# Patient Record
Sex: FEMALE | Race: WHITE | NOT HISPANIC OR LATINO | Employment: OTHER | URBAN - METROPOLITAN AREA
[De-identification: names, ages, dates, MRNs, and addresses within clinical notes are randomized per-mention and may not be internally consistent; named-entity substitution may affect disease eponyms.]

---

## 2023-04-28 PROBLEM — D64.9 ANEMIA: Status: ACTIVE | Noted: 2023-04-28

## 2023-04-28 PROBLEM — T78.3XXA ACE INHIBITOR-AGGRAVATED ANGIOEDEMA, INITIAL ENCOUNTER: Status: ACTIVE | Noted: 2023-04-28

## 2023-04-28 PROBLEM — D50.9 IRON DEFICIENCY ANEMIA: Status: ACTIVE | Noted: 2023-04-28

## 2023-04-28 PROBLEM — J45.901 EXACERBATION OF ASTHMA (HHS-HCC): Status: ACTIVE | Noted: 2023-04-28

## 2023-04-28 PROBLEM — E78.5 HYPERLIPIDEMIA: Status: ACTIVE | Noted: 2023-04-28

## 2023-04-28 PROBLEM — S43.016A CLOSED ANTERIOR DISLOCATION OF HUMERUS: Status: ACTIVE | Noted: 2023-04-28

## 2023-04-28 PROBLEM — R50.9 ELEVATED TEMPERATURE: Status: ACTIVE | Noted: 2023-04-28

## 2023-04-28 PROBLEM — F32.A DEPRESSION: Status: ACTIVE | Noted: 2023-04-28

## 2023-04-28 PROBLEM — C18.9 COLON CANCER (MULTI): Status: ACTIVE | Noted: 2023-04-28

## 2023-04-28 PROBLEM — K44.9 DIAPHRAGMATIC HERNIA: Status: ACTIVE | Noted: 2023-04-28

## 2023-04-28 PROBLEM — J45.41: Status: ACTIVE | Noted: 2023-04-28

## 2023-04-28 PROBLEM — J32.9 CHRONIC SINUSITIS: Status: ACTIVE | Noted: 2023-04-28

## 2023-04-28 PROBLEM — E83.42 HYPOMAGNESEMIA: Status: ACTIVE | Noted: 2023-04-28

## 2023-04-28 PROBLEM — M25.551 HIP PAIN, RIGHT: Status: ACTIVE | Noted: 2023-04-28

## 2023-04-28 PROBLEM — G47.00 INSOMNIA: Status: ACTIVE | Noted: 2023-04-28

## 2023-04-28 PROBLEM — D17.1 LIPOMA OF BACK: Status: ACTIVE | Noted: 2023-04-28

## 2023-04-28 PROBLEM — T46.4X5A ACE INHIBITOR-AGGRAVATED ANGIOEDEMA, INITIAL ENCOUNTER: Status: ACTIVE | Noted: 2023-04-28

## 2023-04-28 PROBLEM — R07.89 ATYPICAL CHEST PAIN: Status: ACTIVE | Noted: 2023-04-28

## 2023-04-28 PROBLEM — K21.9 GERD (GASTROESOPHAGEAL REFLUX DISEASE): Status: ACTIVE | Noted: 2023-04-28

## 2023-04-28 PROBLEM — E61.1 IRON DEFICIENCY: Status: ACTIVE | Noted: 2023-04-28

## 2023-04-28 PROBLEM — M85.69: Status: ACTIVE | Noted: 2023-04-28

## 2023-04-28 PROBLEM — G56.01 CARPAL TUNNEL SYNDROME, RIGHT: Status: ACTIVE | Noted: 2023-04-28

## 2023-04-28 PROBLEM — R51.9 LEFT TEMPORAL HEADACHE: Status: ACTIVE | Noted: 2023-04-28

## 2023-04-28 PROBLEM — R94.39 ABNORMAL STRESS TEST: Status: ACTIVE | Noted: 2023-04-28

## 2023-04-28 PROBLEM — J01.00 ACUTE MAXILLARY SINUSITIS: Status: ACTIVE | Noted: 2023-04-28

## 2023-04-28 PROBLEM — I10 BENIGN ESSENTIAL HYPERTENSION: Status: ACTIVE | Noted: 2023-04-28

## 2023-04-28 PROBLEM — E03.9 HYPOTHYROIDISM: Status: ACTIVE | Noted: 2023-04-28

## 2023-04-28 PROBLEM — E87.6 HYPOKALEMIA: Status: ACTIVE | Noted: 2023-04-28

## 2023-04-28 RX ORDER — ACETAMINOPHEN 500 MG
TABLET ORAL EVERY 6 HOURS PRN
COMMUNITY

## 2023-04-28 RX ORDER — PHENYLEPHRINE HCL 10 MG
TABLET ORAL
COMMUNITY
End: 2023-05-11 | Stop reason: SDUPTHER

## 2023-04-28 RX ORDER — FLUTICASONE PROPIONATE AND SALMETEROL 250; 50 UG/1; UG/1
1 POWDER RESPIRATORY (INHALATION) DAILY
COMMUNITY
Start: 2023-01-24

## 2023-04-28 RX ORDER — POTASSIUM CHLORIDE 750 MG/1
1 TABLET, FILM COATED, EXTENDED RELEASE ORAL DAILY
COMMUNITY
Start: 2019-01-23 | End: 2023-05-11 | Stop reason: SDUPTHER

## 2023-04-28 RX ORDER — ATORVASTATIN CALCIUM 40 MG/1
1 TABLET, FILM COATED ORAL NIGHTLY
COMMUNITY
Start: 2019-04-16 | End: 2024-02-22 | Stop reason: SDUPTHER

## 2023-04-28 RX ORDER — BUPROPION HYDROCHLORIDE 150 MG/1
1 TABLET, EXTENDED RELEASE ORAL EVERY 12 HOURS
COMMUNITY
Start: 2019-04-16 | End: 2024-02-22 | Stop reason: SDUPTHER

## 2023-04-28 RX ORDER — LEVOTHYROXINE SODIUM 125 UG/1
1 TABLET ORAL DAILY
COMMUNITY
Start: 2018-06-28 | End: 2023-05-11 | Stop reason: SDUPTHER

## 2023-04-28 RX ORDER — OMEPRAZOLE 20 MG/1
CAPSULE, DELAYED RELEASE ORAL
COMMUNITY
Start: 2019-06-13

## 2023-04-28 RX ORDER — ASPIRIN 81 MG/1
1 TABLET ORAL DAILY
COMMUNITY
End: 2023-11-13 | Stop reason: ALTCHOICE

## 2023-04-28 RX ORDER — MONTELUKAST SODIUM 10 MG/1
1 TABLET ORAL DAILY
COMMUNITY
Start: 2019-11-04 | End: 2024-02-22 | Stop reason: SDUPTHER

## 2023-04-28 RX ORDER — ACETAMINOPHEN, DIPHENHYDRAMINE HCL, PHENYLEPHRINE HCL 325; 25; 5 MG/1; MG/1; MG/1
TABLET ORAL
COMMUNITY
End: 2023-11-13 | Stop reason: ALTCHOICE

## 2023-04-28 RX ORDER — FERROUS SULFATE 325(65) MG
1 TABLET ORAL DAILY
COMMUNITY
Start: 2020-04-22

## 2023-04-28 RX ORDER — LANOLIN ALCOHOL/MO/W.PET/CERES
CREAM (GRAM) TOPICAL
COMMUNITY
Start: 2019-01-15 | End: 2023-06-15

## 2023-04-28 RX ORDER — MINERAL OIL
1 ENEMA (ML) RECTAL DAILY
COMMUNITY
Start: 2019-06-13

## 2023-04-28 RX ORDER — ALBUTEROL SULFATE 90 UG/1
AEROSOL, METERED RESPIRATORY (INHALATION) EVERY 6 HOURS
COMMUNITY
Start: 2018-06-28

## 2023-04-28 RX ORDER — ACETAMINOPHEN 500 MG
TABLET ORAL
COMMUNITY

## 2023-04-28 RX ORDER — AMLODIPINE BESYLATE 10 MG/1
1 TABLET ORAL DAILY
COMMUNITY
Start: 2018-08-14

## 2023-04-28 RX ORDER — VITS A,C,E/LUTEIN/MINERALS 300MCG-200
1 TABLET ORAL DAILY
COMMUNITY

## 2023-04-28 RX ORDER — FLUTICASONE PROPIONATE 110 UG/1
AEROSOL, METERED RESPIRATORY (INHALATION) 2 TIMES DAILY
COMMUNITY
End: 2023-11-13 | Stop reason: ALTCHOICE

## 2023-04-28 RX ORDER — PARSLEY 450 MG
CAPSULE ORAL
COMMUNITY
Start: 2022-07-18 | End: 2023-11-13 | Stop reason: WASHOUT

## 2023-04-28 RX ORDER — METFORMIN HYDROCHLORIDE 850 MG/1
1 TABLET ORAL
COMMUNITY
Start: 2019-04-16 | End: 2024-02-22 | Stop reason: SDUPTHER

## 2023-04-28 RX ORDER — GABAPENTIN 300 MG/1
1 CAPSULE ORAL NIGHTLY
COMMUNITY
Start: 2018-06-28 | End: 2023-05-11 | Stop reason: SDUPTHER

## 2023-05-05 LAB
ALANINE AMINOTRANSFERASE (SGPT) (U/L) IN SER/PLAS: 15 U/L (ref 7–45)
ALBUMIN (G/DL) IN SER/PLAS: 3.9 G/DL (ref 3.4–5)
ALBUMIN (MG/L) IN URINE: <7 MG/L
ALBUMIN/CREATININE (UG/MG) IN URINE: NORMAL UG/MG CRT (ref 0–30)
ALKALINE PHOSPHATASE (U/L) IN SER/PLAS: 71 U/L (ref 33–136)
ANION GAP IN SER/PLAS: 13 MMOL/L (ref 10–20)
ASPARTATE AMINOTRANSFERASE (SGOT) (U/L) IN SER/PLAS: 17 U/L (ref 9–39)
BASOPHILS (10*3/UL) IN BLOOD BY AUTOMATED COUNT: 0.06 X10E9/L (ref 0–0.1)
BASOPHILS/100 LEUKOCYTES IN BLOOD BY AUTOMATED COUNT: 0.8 % (ref 0–2)
BILIRUBIN TOTAL (MG/DL) IN SER/PLAS: 0.4 MG/DL (ref 0–1.2)
CALCIDIOL (25 OH VITAMIN D3) (NG/ML) IN SER/PLAS: 55 NG/ML
CALCIUM (MG/DL) IN SER/PLAS: 9 MG/DL (ref 8.6–10.3)
CARBON DIOXIDE, TOTAL (MMOL/L) IN SER/PLAS: 28 MMOL/L (ref 21–32)
CHLORIDE (MMOL/L) IN SER/PLAS: 107 MMOL/L (ref 98–107)
CHOLESTEROL (MG/DL) IN SER/PLAS: 145 MG/DL (ref 0–199)
CHOLESTEROL IN HDL (MG/DL) IN SER/PLAS: 76.6 MG/DL
CHOLESTEROL/HDL RATIO: 1.9
COBALAMIN (VITAMIN B12) (PG/ML) IN SER/PLAS: 660 PG/ML (ref 211–911)
CREATININE (MG/DL) IN SER/PLAS: 0.8 MG/DL (ref 0.5–1.05)
CREATININE (MG/DL) IN URINE: 64.7 MG/DL (ref 20–320)
EOSINOPHILS (10*3/UL) IN BLOOD BY AUTOMATED COUNT: 0.33 X10E9/L (ref 0–0.4)
EOSINOPHILS/100 LEUKOCYTES IN BLOOD BY AUTOMATED COUNT: 4.2 % (ref 0–6)
ERYTHROCYTE DISTRIBUTION WIDTH (RATIO) BY AUTOMATED COUNT: 13.6 % (ref 11.5–14.5)
ERYTHROCYTE MEAN CORPUSCULAR HEMOGLOBIN CONCENTRATION (G/DL) BY AUTOMATED: 31.4 G/DL (ref 32–36)
ERYTHROCYTE MEAN CORPUSCULAR VOLUME (FL) BY AUTOMATED COUNT: 93 FL (ref 80–100)
ERYTHROCYTES (10*6/UL) IN BLOOD BY AUTOMATED COUNT: 4.2 X10E12/L (ref 4–5.2)
ESTIMATED AVERAGE GLUCOSE FOR HBA1C: 128 MG/DL
GFR FEMALE: 76 ML/MIN/1.73M2
GLUCOSE (MG/DL) IN SER/PLAS: 87 MG/DL (ref 74–99)
HEMATOCRIT (%) IN BLOOD BY AUTOMATED COUNT: 39.2 % (ref 36–46)
HEMOGLOBIN (G/DL) IN BLOOD: 12.3 G/DL (ref 12–16)
HEMOGLOBIN A1C/HEMOGLOBIN TOTAL IN BLOOD: 6.1 %
IMMATURE GRANULOCYTES/100 LEUKOCYTES IN BLOOD BY AUTOMATED COUNT: 0.1 % (ref 0–0.9)
LDL: 52 MG/DL (ref 0–99)
LEUKOCYTES (10*3/UL) IN BLOOD BY AUTOMATED COUNT: 7.9 X10E9/L (ref 4.4–11.3)
LYMPHOCYTES (10*3/UL) IN BLOOD BY AUTOMATED COUNT: 2.5 X10E9/L (ref 0.8–3)
LYMPHOCYTES/100 LEUKOCYTES IN BLOOD BY AUTOMATED COUNT: 31.5 % (ref 13–44)
MAGNESIUM (MG/DL) IN SER/PLAS: 1.26 MG/DL (ref 1.6–2.4)
MONOCYTES (10*3/UL) IN BLOOD BY AUTOMATED COUNT: 0.37 X10E9/L (ref 0.05–0.8)
MONOCYTES/100 LEUKOCYTES IN BLOOD BY AUTOMATED COUNT: 4.7 % (ref 2–10)
NEUTROPHILS (10*3/UL) IN BLOOD BY AUTOMATED COUNT: 4.66 X10E9/L (ref 1.6–5.5)
NEUTROPHILS/100 LEUKOCYTES IN BLOOD BY AUTOMATED COUNT: 58.7 % (ref 40–80)
PLATELETS (10*3/UL) IN BLOOD AUTOMATED COUNT: 337 X10E9/L (ref 150–450)
POTASSIUM (MMOL/L) IN SER/PLAS: 3.8 MMOL/L (ref 3.5–5.3)
PROTEIN TOTAL: 6.7 G/DL (ref 6.4–8.2)
SODIUM (MMOL/L) IN SER/PLAS: 144 MMOL/L (ref 136–145)
THYROTROPIN (MIU/L) IN SER/PLAS BY DETECTION LIMIT <= 0.05 MIU/L: 3.02 MIU/L (ref 0.44–3.98)
TRIGLYCERIDE (MG/DL) IN SER/PLAS: 80 MG/DL (ref 0–149)
UREA NITROGEN (MG/DL) IN SER/PLAS: 18 MG/DL (ref 6–23)
VLDL: 16 MG/DL (ref 0–40)

## 2023-05-06 LAB
ALLERGEN ANIMAL: CAT DANDER IGE (KU/L): 6.23 KU/L
ALLERGEN ANIMAL: DOG DANDER IGE (KU/L): 0.43 KU/L
ALLERGEN GRASS: BERMUDA GRASS (CYNODON DACTYLON) IGE (KU/L): <0.1 KU/L
ALLERGEN GRASS: JOHNSON GRASS (SORGHUM HALEPENSE) IGE (KU/L): <0.1 KU/L
ALLERGEN GRASS: MEADOW GRASS, KENTUCKY BLUE (POA PRATENSIS )IGE (KU/L): <0.1 KU/L
ALLERGEN GRASS: TIMOTHY GRASS (PHLEUM PRATENSE) IGE (KU/L): <0.1 KU/L
ALLERGEN INSECT: COCKROACH IGE: <0.1 KU/L
ALLERGEN MICROORGANISM: ALTERNARIA ALTERNATA IGE (KU/L): <0.1 KU/L
ALLERGEN MICROORGANISM: ASPERGILLUS FUMIGATUS IGE (KU/L): <0.1 KU/L
ALLERGEN MICROORGANISM: CLADOSPORIUM HERBARUM IGE (KU/L): <0.1 KU/L
ALLERGEN MICROORGANISM: PENICILLIUM CHRYSOGENUM (P. NOTATUM) IGE (KU/L): <0.1 KU/L
ALLERGEN MITE: DERMATOPHAGOIDES FARINAE (HOUSE DUST MITE) IGE (KU/L): <0.1 KU/L
ALLERGEN MITE: DERMATOPHAGOIDES PTERONYSSINUS (HOUSE DUST MITE) IGE (KU/L): <0.1 KU/L
ALLERGEN TREE: BOX-ELDER (ACER NEGUNDO) IGE (KU/L): <0.1 KU/L
ALLERGEN TREE: COMMON SILVER BIRCH (BETULA VERRUCOSA) IGE (KU/L): <0.1 KU/L
ALLERGEN TREE: COTTONWOOD (POPULUS DELTOIDES) IGE (KU/L): <0.1 KU/L
ALLERGEN TREE: ELM (ULMUS AMERICANA) IGE (KU/L): <0.1 KU/L
ALLERGEN TREE: MAPLE LEAF SYCAMORE, LONDON PLANE IGE (KU/L): <0.1 KU/L
ALLERGEN TREE: MOUNTAIN JUNIPER (JUNIPERUS SABINOIDES) IGE (KU/L): <0.1 KU/L
ALLERGEN TREE: MULBERRY (MORUS ALBA) IGE (KU/L): <0.1 KU/L
ALLERGEN TREE: OAK (QUERCUS ALBA) IGE (KU/L): <0.1 KU/L
ALLERGEN TREE: PECAN, HICKORY (CARYA PECAN) IGE (KU/L): <0.1 KU/L
ALLERGEN TREE: WALNUT IGE: <0.1 KU/L
ALLERGEN TREE: WHITE ASH (FRAXINUS AMERICANA) IGE (KU/L): <0.1 KU/L
ALLERGEN WEED: COMMON PIGWEED (AMARANTHUS RETROFLEXUS) IGE (KU/L): <0.1 KU/L
ALLERGEN WEED: COMMON RAGWEED (AMB. ARTEMISIIFOLIA/A. ELATIOR) IGE (KU/L): <0.1 KU/L
ALLERGEN WEED: GOOSEFOOT, LAMB'S QUARTERS (CHENOPODIUM ALBUM) IGE (KU/L): 0.12 KU/L
ALLERGEN WEED: PLANTAIN (ENGLISH), RIBWORT (PLANTAGO LANCEOLATA) IGE (KU/L): <0.1 KU/L
ALLERGEN WEED: PRICKLY SALTWORT/RUSSIAN THISTLE (SALSOLA KALI) IGE (KU/L): <0.1 KU/L
ALLERGEN WEED: SHEEP SORREL (RUMEX ACETOSELLA) IGE (KU/L): <0.1 KU/L
IMMUNOCAP IGE: 47.1 KU/L (ref 0–214)
IMMUNOCAP INTERPRETATION: ABNORMAL

## 2023-05-11 ENCOUNTER — OFFICE VISIT (OUTPATIENT)
Dept: PRIMARY CARE | Facility: CLINIC | Age: 78
End: 2023-05-11
Payer: MEDICARE

## 2023-05-11 VITALS
OXYGEN SATURATION: 100 % | RESPIRATION RATE: 16 BRPM | SYSTOLIC BLOOD PRESSURE: 132 MMHG | HEART RATE: 82 BPM | HEIGHT: 65 IN | DIASTOLIC BLOOD PRESSURE: 72 MMHG | WEIGHT: 167 LBS | BODY MASS INDEX: 27.82 KG/M2 | TEMPERATURE: 97.7 F

## 2023-05-11 DIAGNOSIS — M70.61 TROCHANTERIC BURSITIS OF RIGHT HIP: ICD-10-CM

## 2023-05-11 DIAGNOSIS — E66.9 DIABETES MELLITUS TYPE 2 IN OBESE: ICD-10-CM

## 2023-05-11 DIAGNOSIS — E78.5 HYPERLIPIDEMIA, UNSPECIFIED HYPERLIPIDEMIA TYPE: ICD-10-CM

## 2023-05-11 DIAGNOSIS — E87.6 HYPOKALEMIA: ICD-10-CM

## 2023-05-11 DIAGNOSIS — E03.8 OTHER SPECIFIED HYPOTHYROIDISM: Primary | ICD-10-CM

## 2023-05-11 DIAGNOSIS — O28.0 LOW MATERNAL SERUM VITAMIN B12: ICD-10-CM

## 2023-05-11 DIAGNOSIS — C18.9 MALIGNANT NEOPLASM OF COLON, UNSPECIFIED PART OF COLON (MULTI): ICD-10-CM

## 2023-05-11 DIAGNOSIS — J45.41 ACUTE EXACERBATION OF MODERATE PERSISTENT EXTRINSIC ASTHMA (HHS-HCC): ICD-10-CM

## 2023-05-11 DIAGNOSIS — E11.69 DIABETES MELLITUS TYPE 2 IN OBESE: ICD-10-CM

## 2023-05-11 DIAGNOSIS — Z12.31 VISIT FOR SCREENING MAMMOGRAM: ICD-10-CM

## 2023-05-11 PROCEDURE — 1159F MED LIST DOCD IN RCRD: CPT | Performed by: INTERNAL MEDICINE

## 2023-05-11 PROCEDURE — 1036F TOBACCO NON-USER: CPT | Performed by: INTERNAL MEDICINE

## 2023-05-11 PROCEDURE — 3075F SYST BP GE 130 - 139MM HG: CPT | Performed by: INTERNAL MEDICINE

## 2023-05-11 PROCEDURE — 3078F DIAST BP <80 MM HG: CPT | Performed by: INTERNAL MEDICINE

## 2023-05-11 PROCEDURE — 1157F ADVNC CARE PLAN IN RCRD: CPT | Performed by: INTERNAL MEDICINE

## 2023-05-11 PROCEDURE — 99214 OFFICE O/P EST MOD 30 MIN: CPT | Performed by: INTERNAL MEDICINE

## 2023-05-11 RX ORDER — PHENYLEPHRINE HCL 10 MG
1000 TABLET ORAL 2 TIMES DAILY
COMMUNITY

## 2023-05-11 RX ORDER — LEVOTHYROXINE SODIUM 125 UG/1
125 TABLET ORAL DAILY
Qty: 90 TABLET | Refills: 1 | Status: SHIPPED | OUTPATIENT
Start: 2023-05-11 | End: 2023-11-13 | Stop reason: SDUPTHER

## 2023-05-11 RX ORDER — GABAPENTIN 300 MG/1
300 CAPSULE ORAL NIGHTLY
Qty: 90 CAPSULE | Refills: 1 | Status: SHIPPED | OUTPATIENT
Start: 2023-05-11 | End: 2024-02-22 | Stop reason: SDUPTHER

## 2023-05-11 RX ORDER — POTASSIUM CHLORIDE 750 MG/1
10 TABLET, FILM COATED, EXTENDED RELEASE ORAL DAILY
Qty: 90 TABLET | Refills: 1 | Status: SHIPPED | OUTPATIENT
Start: 2023-05-11 | End: 2023-11-13

## 2023-05-11 SDOH — ECONOMIC STABILITY: FOOD INSECURITY: WITHIN THE PAST 12 MONTHS, YOU WORRIED THAT YOUR FOOD WOULD RUN OUT BEFORE YOU GOT MONEY TO BUY MORE.: NEVER TRUE

## 2023-05-11 SDOH — ECONOMIC STABILITY: FOOD INSECURITY: WITHIN THE PAST 12 MONTHS, THE FOOD YOU BOUGHT JUST DIDN'T LAST AND YOU DIDN'T HAVE MONEY TO GET MORE.: NEVER TRUE

## 2023-05-11 ASSESSMENT — LIFESTYLE VARIABLES
AUDIT-C TOTAL SCORE: 2
SKIP TO QUESTIONS 9-10: 1
HOW OFTEN DO YOU HAVE A DRINK CONTAINING ALCOHOL: 2-4 TIMES A MONTH
HOW OFTEN DO YOU HAVE SIX OR MORE DRINKS ON ONE OCCASION: NEVER
HOW MANY STANDARD DRINKS CONTAINING ALCOHOL DO YOU HAVE ON A TYPICAL DAY: PATIENT DOES NOT DRINK

## 2023-05-11 ASSESSMENT — PATIENT HEALTH QUESTIONNAIRE - PHQ9
SUM OF ALL RESPONSES TO PHQ9 QUESTIONS 1 & 2: 0
1. LITTLE INTEREST OR PLEASURE IN DOING THINGS: NOT AT ALL
2. FEELING DOWN, DEPRESSED OR HOPELESS: NOT AT ALL

## 2023-05-11 ASSESSMENT — ENCOUNTER SYMPTOMS
LOSS OF SENSATION IN FEET: 0
OCCASIONAL FEELINGS OF UNSTEADINESS: 1
DEPRESSION: 0

## 2023-05-11 NOTE — PROGRESS NOTES
Chief Complaint/HPI:    History of Present Illness  asthma, patient has seen pulmonary medicine, she states that treatment with Wixela has been very helpful. Allergy testing was completed, she is allergic to cats, patient does have 2 cats she states      foot ulcer: patient had a large callus that was trimmed by Dr Patel, patient is monitored by podiatry every 10 weeks     hypomagnesemia: takes magnesium once daily     iron deficiency/ colon cancer: patient had right hemicolectomy in 8/2020 after colonoscopy noted colon cancer. Patient has not had any additional issues, Dr Pereira has retired. Patient is doing well now. Had colonoscopy after start of pandemic, per Dr Ramirez, it was normal. No further colonoscopies are required for 5 years from most recent colonoscopy.      mammogram screening: patient was advised to get a follow up mammogram after 6/29/2023, most recent mammogram was done at [facility] otherwise negative aside from what was mentioned above in HPI.      Patient Active Problem List   Diagnosis    Abnormal stress test    ACE inhibitor-aggravated angioedema, initial encounter    Acute exacerbation of moderate persistent extrinsic asthma    Acute maxillary sinusitis    Anemia    Exacerbation of asthma    Atypical chest pain    Benign essential hypertension    Carpal tunnel syndrome, right    Chronic sinusitis    Closed anterior dislocation of humerus    Colon cancer (CMS/HCC)    Depression    Diaphragmatic hernia    Elevated temperature    GERD (gastroesophageal reflux disease)    Hip pain, right    Hyperlipidemia    Hypokalemia    Hypomagnesemia    Hypothyroidism    Insomnia    Iron deficiency    Iron deficiency anemia    Left temporal headache    Lipoma of back    Localized cyst of bone    Chronic pain    Displacement of lumbar intervertebral disc without myelopathy    Inflammation of right sacroiliac joint (CMS/HCC)    Myalgia    Trochanteric bursitis of right hip    Pain in right hip     Diabetes mellitus type 2 in obese (CMS/[facility])         Past Medical History:   Diagnosis Date    Personal history of other benign neoplasm     History of benign neoplasm of colon    Personal history of other endocrine, nutritional and metabolic disease     History of hyperlipidemia    Retinal detachment with single break, unspecified eye     Partial recent retinal detachment with single defect    Vitreous degeneration, unspecified eye     Vitreous degeneration     Past Surgical History:   Procedure Laterality Date    OTHER SURGICAL HISTORY  2020    Hemicolectomy    OTHER SURGICAL HISTORY  2020    Colon surgery    OTHER SURGICAL HISTORY  2020    Hip replacement    OTHER SURGICAL HISTORY  2020     section    OTHER SURGICAL HISTORY  2020    Cholecystectomy    OTHER SURGICAL HISTORY  2020    Amputation    OTHER SURGICAL HISTORY  2020    Colonoscopy    OTHER SURGICAL HISTORY  2020    Toe tenotomy     Social History     Social History Narrative    Not on file         ALLERGIES  Bacitracin, Codeine, and Lisinopril      MEDICATIONS  Current Outpatient Medications on File Prior to Visit   Medication Sig Dispense Refill    acetaminophen (Tylenol) 500 mg tablet Take by mouth every 6 hours if needed.      albuterol 90 mcg/actuation inhaler Inhale every 6 hours.      amLODIPine (Norvasc) 5 mg tablet Take 1 tablet (5 mg) by mouth once daily.      aspirin 81 mg EC tablet Take 1 tablet (81 mg) by mouth once daily.      atorvastatin (Lipitor) 40 mg tablet Take 1 tablet (40 mg) by mouth once daily at bedtime.      buPROPion SR (Wellbutrin SR) 150 mg 12 hr tablet Take 1 tablet (150 mg) by mouth every 12 hours.      cholecalciferol (Vitamin D-3) 50 mcg (2,000 unit) capsule Take by mouth.      cinnamon 500 mg capsule Take 2 capsules (1,000 mg) by mouth twice a day.      ferrous sulfate 325 (65 Fe) MG tablet Take 1 tablet (325 mg) by mouth once daily.      fexofenadine (Allegra) 180 mg  "tablet Take 1 tablet (180 mg) by mouth once daily.      magnesium oxide (Mag-Ox) 400 mg (241.3 mg magnesium) tablet Take by mouth.      metFORMIN (Glucophage) 850 mg tablet Take 1 tablet (850 mg) by mouth 2 times a day with meals.      montelukast (Singulair) 10 mg tablet Take 1 tablet (10 mg) by mouth once daily.      omeprazole (PriLOSEC) 20 mg DR capsule Take by mouth once daily.      vit A,C and E-lutein-minerals (Ocuvite with Lutein) 300 mcg-200 mg-27 mg-2 mg tablet Take 1 tablet by mouth once daily.      Wixela Inhub 250-50 mcg/dose diskus inhaler Inhale 1 puff once daily.      [DISCONTINUED] cinnamon 500 mg capsule Take by mouth.      [DISCONTINUED] gabapentin (Neurontin) 300 mg capsule Take 1 capsule (300 mg) by mouth once daily at bedtime.      [DISCONTINUED] levothyroxine (Synthroid, Levoxyl) 125 mcg tablet Take 1 tablet (125 mcg) by mouth once daily.      [DISCONTINUED] potassium chloride CR (Klor-Con) 10 mEq ER tablet Take 1 tablet (10 mEq) by mouth once daily.      ashwagandha root extract 500 mg capsule Take by mouth.      fluticasone (Flovent) 110 mcg/actuation inhaler Inhale twice a day.      melatonin 10 mg tablet Take by mouth.       No current facility-administered medications on file prior to visit.         PHYSICAL EXAM  /72 (BP Location: Left arm, Patient Position: Sitting, BP Cuff Size: Adult)   Pulse 82   Temp 36.5 °C (97.7 °F)   Resp 16   Ht 1.638 m (5' 4.5\")   Wt 75.8 kg (167 lb)   SpO2 100%   BMI 28.22 kg/m²   Body mass index is 28.22 kg/m².  Constitutional   General appearance: Alert and in no acute distress. well developed, well nourished, overweight and pleasant , wearing a mask   Eyes   Inspection of eyes: Sclera and conjunctiva were normal.   Ears, Nose, Mouth, and Throat   Ears: Auricles: Normal. No thyromegaly or neck masses are noted   Pulmonary   Respiratory assessment: No respiratory distress, normal respiratory rhythm and effort.    Auscultation of lungs: Abnormal.  " Auscultation of the lungs revealed no wheezing, no rales or crackles were heard bilaterally. no rhonchi. no friction rub. No diminished breath sounds. no bronchial breath sounds.   Cardiovascular   Auscultation of heart: Abnormal.  The heart rate was normal. The rhythm was regular. Heart sounds: normal S1 and normal S2. A grade 2 systolic murmur was heard at the LUSB. A grade 1 systolic murmur was heard at the RLSB. Unchanged. No carotid bruits are noted  Lymphatic   Palpation of lymph nodes in neck: No cervical lymphadenopathy.   Neurologic   Cranial nerves: Nerves 2-12 were intact, no focal neuro defects. wearing a mask.   Psychiatric   Orientation: Oriented to person, place, and time.    Mood and affect: Normal.     ASSESSMENT/PLAN  Problem List Items Addressed This Visit          Respiratory    Acute exacerbation of moderate persistent extrinsic asthma    Current Assessment & Plan     Asthma is well controlled now, no changes in treatment, follow up with pulmonary medicine as scheduled         Relevant Orders    Vitamin D 1,25 Dihydroxy    Comprehensive Metabolic Panel    CBC and Auto Differential       Digestive    Colon cancer (CMS/HCC)    Current Assessment & Plan     To follow up for colonoscopy with Dr Ramirez every 5 years         Relevant Orders    Vitamin D 1,25 Dihydroxy    Comprehensive Metabolic Panel    CBC and Auto Differential       Musculoskeletal    Trochanteric bursitis of right hip    Relevant Medications    gabapentin (Neurontin) 300 mg capsule    Other Relevant Orders    Vitamin D 1,25 Dihydroxy    Comprehensive Metabolic Panel    CBC and Auto Differential       Endocrine/Metabolic    Hypothyroidism - Primary    Current Assessment & Plan     check thyroid studies prior to next appointment         Relevant Medications    levothyroxine (Synthroid, Levoxyl) 125 mcg tablet    Other Relevant Orders    Vitamin D 1,25 Dihydroxy    Comprehensive Metabolic Panel    CBC and Auto Differential     Diabetes mellitus type 2 in obese (CMS/HCC)    Current Assessment & Plan     Stable on metformin therapy, recheck labs in 6 months, prior to next visit         Relevant Orders    Hemoglobin A1C    Vitamin D 1,25 Dihydroxy    Albumin , Urine Random    Comprehensive Metabolic Panel    CBC and Auto Differential       Other    Hyperlipidemia    Current Assessment & Plan     No med changes, recheck labs in 6 months         Relevant Orders    TSH with reflex to Free T4 if abnormal    Vitamin D 1,25 Dihydroxy    Comprehensive Metabolic Panel    CBC and Auto Differential    Lipid panel    Hypokalemia    Relevant Medications    potassium chloride CR (Klor-Con) 10 mEq ER tablet    Other Relevant Orders    Vitamin D 1,25 Dihydroxy    Comprehensive Metabolic Panel    CBC and Auto Differential     Other Visit Diagnoses       Visit for screening mammogram        Relevant Orders    BI mammo bilateral screening tomosynthesis    Vitamin D 1,25 Dihydroxy    Comprehensive Metabolic Panel    CBC and Auto Differential    Low maternal serum vitamin B12        Relevant Orders    Vitamin B12          Check labs in 6 months, follow up in 6 months  Check mammograms as requested  Melo Benjamin MD

## 2023-05-11 NOTE — ASSESSMENT & PLAN NOTE
Asthma is well controlled now, no changes in treatment, follow up with pulmonary medicine as scheduled

## 2023-06-14 DIAGNOSIS — E83.42 HYPOMAGNESEMIA: Primary | ICD-10-CM

## 2023-06-15 RX ORDER — LANOLIN ALCOHOL/MO/W.PET/CERES
CREAM (GRAM) TOPICAL
Qty: 90 TABLET | Refills: 0 | Status: SHIPPED | OUTPATIENT
Start: 2023-06-15 | End: 2023-09-22

## 2023-09-21 DIAGNOSIS — E83.42 HYPOMAGNESEMIA: ICD-10-CM

## 2023-09-22 PROBLEM — M54.30 SCIATICA: Status: ACTIVE | Noted: 2023-09-22

## 2023-09-22 PROBLEM — M79.89 SWELLING OF CALF: Status: ACTIVE | Noted: 2023-09-22

## 2023-09-22 PROBLEM — K63.5 POLYP OF COLON: Status: ACTIVE | Noted: 2023-09-22

## 2023-09-22 PROBLEM — J30.9 ALLERGIC RHINITIS: Status: ACTIVE | Noted: 2023-09-22

## 2023-09-22 PROBLEM — M85.89 OSTEOPENIA OF MULTIPLE SITES: Status: ACTIVE | Noted: 2023-09-22

## 2023-09-22 PROBLEM — G56.00: Status: ACTIVE | Noted: 2023-09-22

## 2023-09-22 PROBLEM — M79.609 PAIN IN SOFT TISSUES OF LIMB: Status: ACTIVE | Noted: 2023-09-22

## 2023-09-22 PROBLEM — M25.519 PAIN IN JOINT, SHOULDER REGION: Status: ACTIVE | Noted: 2023-09-22

## 2023-09-22 PROBLEM — M20.40 HAMMER TOE: Status: ACTIVE | Noted: 2023-09-22

## 2023-09-22 PROBLEM — N28.1 RENAL CYSTS, ACQUIRED, BILATERAL: Status: ACTIVE | Noted: 2023-09-22

## 2023-09-22 PROBLEM — M25.561 POSTERIOR RIGHT KNEE PAIN: Status: ACTIVE | Noted: 2023-09-22

## 2023-09-22 PROBLEM — M79.661 RIGHT CALF PAIN: Status: ACTIVE | Noted: 2023-09-22

## 2023-09-22 PROBLEM — G47.33 OBSTRUCTIVE SLEEP APNEA SYNDROME IN ADULT: Status: ACTIVE | Noted: 2023-09-22

## 2023-09-22 PROBLEM — M54.50 LOW BACK PAIN: Status: ACTIVE | Noted: 2023-09-22

## 2023-09-22 PROBLEM — J45.40 MODERATE PERSISTENT ASTHMA (HHS-HCC): Status: ACTIVE | Noted: 2023-09-22

## 2023-09-22 PROBLEM — E53.8 VITAMIN B12 DEFICIENCY: Status: ACTIVE | Noted: 2023-09-22

## 2023-09-22 PROBLEM — C18.2 MALIGNANT NEOPLASM OF ASCENDING COLON (MULTI): Status: ACTIVE | Noted: 2023-09-22

## 2023-09-22 PROBLEM — J06.9 URI, ACUTE: Status: ACTIVE | Noted: 2023-09-22

## 2023-09-22 PROBLEM — E11.41: Status: ACTIVE | Noted: 2023-09-22

## 2023-09-22 PROBLEM — M17.12 PRIMARY OSTEOARTHRITIS OF LEFT KNEE: Status: ACTIVE | Noted: 2023-09-22

## 2023-09-22 PROBLEM — R07.9 CHEST PAIN: Status: ACTIVE | Noted: 2023-09-22

## 2023-09-22 PROBLEM — E11.40 NEUROPATHY IN DIABETES (MULTI): Status: ACTIVE | Noted: 2023-09-22

## 2023-09-22 PROBLEM — G44.209 MUSCLE TENSION HEADACHE: Status: ACTIVE | Noted: 2023-09-22

## 2023-09-22 PROBLEM — M85.80 OSTEOPENIA: Status: ACTIVE | Noted: 2023-09-22

## 2023-09-22 PROBLEM — E11.9 DIABETES MELLITUS (MULTI): Status: ACTIVE | Noted: 2023-09-22

## 2023-09-22 PROBLEM — R01.1 SYSTOLIC EJECTION MURMUR: Status: ACTIVE | Noted: 2023-09-22

## 2023-09-22 PROBLEM — Z87.39 H/O DEGENERATIVE DISC DISEASE: Status: ACTIVE | Noted: 2023-09-22

## 2023-09-22 PROBLEM — E55.9 VITAMIN D DEFICIENCY: Status: ACTIVE | Noted: 2023-09-22

## 2023-09-22 PROBLEM — M71.22 SYNOVIAL CYST OF LEFT POPLITEAL SPACE: Status: ACTIVE | Noted: 2023-09-22

## 2023-09-22 PROBLEM — F32.9 MAJOR DEPRESSIVE DISORDER, SINGLE EPISODE: Status: ACTIVE | Noted: 2023-09-22

## 2023-09-22 PROBLEM — J45.909 ASTHMA (HHS-HCC): Status: ACTIVE | Noted: 2023-09-22

## 2023-09-22 RX ORDER — LANOLIN ALCOHOL/MO/W.PET/CERES
CREAM (GRAM) TOPICAL
Qty: 90 TABLET | Refills: 1 | Status: SHIPPED | OUTPATIENT
Start: 2023-09-22 | End: 2024-03-28

## 2023-10-04 ENCOUNTER — ANCILLARY PROCEDURE (OUTPATIENT)
Dept: PREADMISSION TESTING | Facility: HOSPITAL | Age: 78
End: 2023-10-04
Payer: MEDICARE

## 2023-10-04 VITALS
BODY MASS INDEX: 26.91 KG/M2 | HEART RATE: 81 BPM | HEIGHT: 65 IN | OXYGEN SATURATION: 99 % | TEMPERATURE: 98.7 F | WEIGHT: 161.5 LBS | RESPIRATION RATE: 16 BRPM

## 2023-10-04 DIAGNOSIS — K43.9 HERNIA OF ABDOMINAL WALL: Primary | ICD-10-CM

## 2023-10-04 LAB
ANION GAP SERPL CALC-SCNC: 15 MMOL/L (ref 10–20)
BUN SERPL-MCNC: 15 MG/DL (ref 6–23)
CALCIUM SERPL-MCNC: 9.2 MG/DL (ref 8.6–10.3)
CHLORIDE SERPL-SCNC: 106 MMOL/L (ref 98–107)
CO2 SERPL-SCNC: 23 MMOL/L (ref 21–32)
CREAT SERPL-MCNC: 0.84 MG/DL (ref 0.5–1.05)
ERYTHROCYTE [DISTWIDTH] IN BLOOD BY AUTOMATED COUNT: 13.9 % (ref 11.5–14.5)
GFR SERPL CREATININE-BSD FRML MDRD: 72 ML/MIN/1.73M*2
GLUCOSE SERPL-MCNC: 79 MG/DL (ref 74–99)
HCT VFR BLD AUTO: 39.8 % (ref 36–46)
HGB BLD-MCNC: 12.8 G/DL (ref 12–16)
MCH RBC QN AUTO: 29.2 PG (ref 26–34)
MCHC RBC AUTO-ENTMCNC: 32.2 G/DL (ref 32–36)
MCV RBC AUTO: 91 FL (ref 80–100)
NRBC BLD-RTO: 0 /100 WBCS (ref 0–0)
PLATELET # BLD AUTO: 343 X10*3/UL (ref 150–450)
PMV BLD AUTO: 9.3 FL (ref 7.5–11.5)
POTASSIUM SERPL-SCNC: 4.1 MMOL/L (ref 3.5–5.3)
RBC # BLD AUTO: 4.39 X10*6/UL (ref 4–5.2)
SODIUM SERPL-SCNC: 140 MMOL/L (ref 136–145)
WBC # BLD AUTO: 8.3 X10*3/UL (ref 4.4–11.3)

## 2023-10-04 PROCEDURE — 93005 ELECTROCARDIOGRAM TRACING: CPT

## 2023-10-04 PROCEDURE — 36415 COLL VENOUS BLD VENIPUNCTURE: CPT

## 2023-10-04 PROCEDURE — 85027 COMPLETE CBC AUTOMATED: CPT

## 2023-10-04 PROCEDURE — 80048 BASIC METABOLIC PNL TOTAL CA: CPT

## 2023-10-04 ASSESSMENT — DUKE ACTIVITY SCORE INDEX (DASI)
CAN YOU TAKE CARE OF YOURSELF (EAT, DRESS, BATHE, OR USE TOILET): YES
CAN YOU DO HEAVY WORK AROUND THE HOUSE LIKE SCRUBBING FLOORS OR LIFTING AND MOVING HEAVY FURNITURE: YES
TOTAL_SCORE: 36.7
CAN YOU PARTICIPATE IN STRENOUS SPORTS LIKE SWIMMING, SINGLES TENNIS, FOOTBALL, BASKETBALL, OR SKIING: NO
DASI METS SCORE: 7.3
CAN YOU DO YARD WORK LIKE RAKING LEAVES, WEEDING OR PUSHING A MOWER: YES
CAN YOU CLIMB A FLIGHT OF STAIRS OR WALK UP A HILL: YES
CAN YOU WALK A BLOCK OR TWO ON LEVEL GROUND: YES
CAN YOU DO LIGHT WORK AROUND THE HOUSE LIKE DUSTING OR WASHING DISHES: YES
CAN YOU PARTICIPATE IN MODERATE RECREATIONAL ACTIVITIES LIKE GOLF, BOWLING, DANCING, DOUBLES TENNIS OR THROWING A BASEBALL OR FOOTBALL: NO
CAN YOU DO MODERATE WORK AROUND THE HOUSE LIKE VACUUMING, SWEEPING FLOORS OR CARRYING GROCERIES: YES
CAN YOU HAVE SEXUAL RELATIONS: YES
CAN YOU WALK INDOORS, SUCH AS AROUND YOUR HOUSE: YES
CAN YOU RUN A SHORT DISTANCE: NO

## 2023-10-04 NOTE — PREPROCEDURE INSTRUCTIONS
Medication List            Accurate as of October 4, 2023 12:56 PM. Always use your most recent med list.                acetaminophen 500 mg tablet  Commonly known as: Tylenol     albuterol 90 mcg/actuation inhaler     amLODIPine 5 mg tablet  Commonly known as: Norvasc     ashwagandha root extract 500 mg capsule     aspirin 81 mg EC tablet     atorvastatin 40 mg tablet  Commonly known as: Lipitor     buPROPion  mg 12 hr tablet  Commonly known as: Wellbutrin SR     cholecalciferol 50 mcg (2,000 unit) capsule  Commonly known as: Vitamin D-3     cinnamon 500 mg capsule     ferrous sulfate 325 (65 Fe) MG tablet     fexofenadine 180 mg tablet  Commonly known as: Allegra     fluticasone 110 mcg/actuation inhaler  Commonly known as: Flovent     gabapentin 300 mg capsule  Commonly known as: Neurontin  Take 1 capsule (300 mg) by mouth once daily at bedtime.     levothyroxine 125 mcg tablet  Commonly known as: Synthroid, Levoxyl  Take 1 tablet (125 mcg) by mouth once daily.     magnesium oxide 400 mg (241.3 mg magnesium) tablet  Commonly known as: Mag-Ox  TAKE 1 TABLET BY MOUTH DAILY     melatonin 10 mg tablet     metFORMIN 850 mg tablet  Commonly known as: Glucophage     montelukast 10 mg tablet  Commonly known as: Singulair     Ocuvite with Lutein 300 mcg-200 mg-27 mg-2 mg tablet  Generic drug: vit A,C and E-lutein-minerals     omeprazole 20 mg DR capsule  Commonly known as: PriLOSEC     potassium chloride CR 10 mEq ER tablet  Commonly known as: Klor-Con  Take 1 tablet (10 mEq) by mouth once daily.     Wixela Inhub 250-50 mcg/dose diskus inhaler  Generic drug: fluticasone propion-salmeteroL                              NPO Instructions:    Do not eat any food after midnight the night before your surgery/procedure.    Additional Instructions:     You will be contacted regarding the time of your arrival to facility and surgery time  Wear  comfortable loose fitting clothing  Do not use moisturizers, creams, lotions  or perfume  All jewelry and valuables should be left at home    Go to Registration upon arrival in the Main lobby.  You must have a ride home by friend or relative.  If they are not staying please bring their cell phone number.

## 2023-10-05 NOTE — PREPROCEDURE INSTRUCTIONS
Medication List            Accurate as of October 4, 2023 11:59 PM. Always use your most recent med list.                acetaminophen 500 mg tablet  Commonly known as: Tylenol     albuterol 90 mcg/actuation inhaler     amLODIPine 5 mg tablet  Commonly known as: Norvasc  Notes to patient: Take Amlodipine the night before as you normally do.      ashwagandha root extract 500 mg capsule     aspirin 81 mg EC tablet  Medication Adjustments for Surgery: Stop 7 days before surgery     atorvastatin 40 mg tablet  Commonly known as: Lipitor     buPROPion  mg 12 hr tablet  Commonly known as: Wellbutrin SR     cholecalciferol 50 mcg (2,000 unit) capsule  Commonly known as: Vitamin D-3     cinnamon 500 mg capsule     ferrous sulfate 325 (65 Fe) MG tablet     fexofenadine 180 mg tablet  Commonly known as: Allegra     fluticasone 110 mcg/actuation inhaler  Commonly known as: Flovent     gabapentin 300 mg capsule  Commonly known as: Neurontin  Take 1 capsule (300 mg) by mouth once daily at bedtime.     levothyroxine 125 mcg tablet  Commonly known as: Synthroid, Levoxyl  Take 1 tablet (125 mcg) by mouth once daily.     magnesium oxide 400 mg (241.3 mg magnesium) tablet  Commonly known as: Mag-Ox  TAKE 1 TABLET BY MOUTH DAILY     melatonin 10 mg tablet     metFORMIN 850 mg tablet  Commonly known as: Glucophage  Medication Adjustments for Surgery: Other (Comment)  Notes to patient: Take night before as usual     montelukast 10 mg tablet  Commonly known as: Singulair     Ocuvite with Lutein 300 mcg-200 mg-27 mg-2 mg tablet  Generic drug: vit A,C and E-lutein-minerals     omeprazole 20 mg DR capsule  Commonly known as: PriLOSEC     potassium chloride CR 10 mEq ER tablet  Commonly known as: Klor-Con  Take 1 tablet (10 mEq) by mouth once daily.     Wixela Inhub 250-50 mcg/dose diskus inhaler  Generic drug: fluticasone propion-salmeteroL  Notes to patient: Use day of surgery if needed                              NPO  Instructions:    Do not eat any food after midnight the night before your surgery/procedure.    Additional Instructions:     Day of Surgery:  Review your medication instructions, take indicated medications  All jewelry and valuables should be left at home

## 2023-10-09 ENCOUNTER — ANESTHESIA EVENT (OUTPATIENT)
Dept: OPERATING ROOM | Facility: HOSPITAL | Age: 78
End: 2023-10-09
Payer: MEDICARE

## 2023-10-10 ENCOUNTER — ANESTHESIA (OUTPATIENT)
Dept: GASTROENTEROLOGY | Facility: HOSPITAL | Age: 78
End: 2023-10-10
Payer: MEDICARE

## 2023-10-10 ENCOUNTER — ANESTHESIA EVENT (OUTPATIENT)
Dept: GASTROENTEROLOGY | Facility: HOSPITAL | Age: 78
End: 2023-10-10
Payer: MEDICARE

## 2023-10-10 ENCOUNTER — HOSPITAL ENCOUNTER (OUTPATIENT)
Dept: GASTROENTEROLOGY | Facility: HOSPITAL | Age: 78
Discharge: HOME | End: 2023-10-10
Payer: MEDICARE

## 2023-10-10 VITALS
TEMPERATURE: 97.7 F | WEIGHT: 158 LBS | RESPIRATION RATE: 18 BRPM | DIASTOLIC BLOOD PRESSURE: 79 MMHG | HEART RATE: 70 BPM | BODY MASS INDEX: 26.33 KG/M2 | SYSTOLIC BLOOD PRESSURE: 143 MMHG | OXYGEN SATURATION: 100 % | HEIGHT: 65 IN

## 2023-10-10 DIAGNOSIS — Z12.11 ENCOUNTER FOR SCREENING FOR MALIGNANT NEOPLASM OF COLON: Primary | ICD-10-CM

## 2023-10-10 PROBLEM — T88.59XA DELAYED EMERGENCE FROM ANESTHESIA: Status: ACTIVE | Noted: 2023-10-10

## 2023-10-10 PROBLEM — D12.5 ADENOMATOUS POLYP OF SIGMOID COLON: Status: ACTIVE | Noted: 2023-10-10

## 2023-10-10 PROCEDURE — 7100000010 HC PHASE TWO TIME - EACH INCREMENTAL 1 MINUTE: Performed by: SURGERY

## 2023-10-10 PROCEDURE — 3700000002 HC GENERAL ANESTHESIA TIME - EACH INCREMENTAL 1 MINUTE: Performed by: SURGERY

## 2023-10-10 PROCEDURE — 2500000004 HC RX 250 GENERAL PHARMACY W/ HCPCS (ALT 636 FOR OP/ED): Performed by: NURSE ANESTHETIST, CERTIFIED REGISTERED

## 2023-10-10 PROCEDURE — 88305 TISSUE EXAM BY PATHOLOGIST: CPT | Mod: TC | Performed by: SURGERY

## 2023-10-10 PROCEDURE — 7100000009 HC PHASE TWO TIME - INITIAL BASE CHARGE: Performed by: SURGERY

## 2023-10-10 PROCEDURE — 88305 TISSUE EXAM BY PATHOLOGIST: CPT | Performed by: PATHOLOGY

## 2023-10-10 PROCEDURE — 2500000005 HC RX 250 GENERAL PHARMACY W/O HCPCS: Performed by: NURSE ANESTHETIST, CERTIFIED REGISTERED

## 2023-10-10 PROCEDURE — 2500000004 HC RX 250 GENERAL PHARMACY W/ HCPCS (ALT 636 FOR OP/ED): Performed by: SURGERY

## 2023-10-10 PROCEDURE — 88305 TISSUE EXAM BY PATHOLOGIST: CPT | Mod: TC,SUR | Performed by: SURGERY

## 2023-10-10 PROCEDURE — 45380 COLONOSCOPY AND BIOPSY: CPT | Performed by: SURGERY

## 2023-10-10 PROCEDURE — 3700000001 HC GENERAL ANESTHESIA TIME - INITIAL BASE CHARGE: Performed by: SURGERY

## 2023-10-10 RX ORDER — LANOLIN ALCOHOL/MO/W.PET/CERES
100 CREAM (GRAM) TOPICAL 3 TIMES WEEKLY
COMMUNITY

## 2023-10-10 RX ORDER — PROPOFOL 10 MG/ML
INJECTION, EMULSION INTRAVENOUS AS NEEDED
Status: DISCONTINUED | OUTPATIENT
Start: 2023-10-10 | End: 2023-10-10

## 2023-10-10 RX ORDER — SODIUM CHLORIDE, SODIUM LACTATE, POTASSIUM CHLORIDE, CALCIUM CHLORIDE 600; 310; 30; 20 MG/100ML; MG/100ML; MG/100ML; MG/100ML
20 INJECTION, SOLUTION INTRAVENOUS CONTINUOUS
Status: DISCONTINUED | OUTPATIENT
Start: 2023-10-10 | End: 2023-10-20 | Stop reason: HOSPADM

## 2023-10-10 RX ORDER — LIDOCAINE HYDROCHLORIDE 20 MG/ML
INJECTION, SOLUTION INFILTRATION; PERINEURAL AS NEEDED
Status: DISCONTINUED | OUTPATIENT
Start: 2023-10-10 | End: 2023-10-10

## 2023-10-10 RX ORDER — SODIUM CHLORIDE 9 MG/ML
20 INJECTION, SOLUTION INTRAVENOUS CONTINUOUS
Status: DISCONTINUED | OUTPATIENT
Start: 2023-10-10 | End: 2023-10-20 | Stop reason: HOSPADM

## 2023-10-10 RX ADMIN — PROPOFOL 20 MG: 10 INJECTION, EMULSION INTRAVENOUS at 10:05

## 2023-10-10 RX ADMIN — PROPOFOL 30 MG: 10 INJECTION, EMULSION INTRAVENOUS at 10:19

## 2023-10-10 RX ADMIN — PROPOFOL 80 MG: 10 INJECTION, EMULSION INTRAVENOUS at 10:03

## 2023-10-10 RX ADMIN — LIDOCAINE HYDROCHLORIDE 50 MG: 20 INJECTION, SOLUTION INFILTRATION; PERINEURAL at 10:03

## 2023-10-10 RX ADMIN — PROPOFOL 20 MG: 10 INJECTION, EMULSION INTRAVENOUS at 10:10

## 2023-10-10 RX ADMIN — PROPOFOL 20 MG: 10 INJECTION, EMULSION INTRAVENOUS at 10:21

## 2023-10-10 RX ADMIN — PROPOFOL 30 MG: 10 INJECTION, EMULSION INTRAVENOUS at 10:07

## 2023-10-10 RX ADMIN — SODIUM CHLORIDE: 9 INJECTION, SOLUTION INTRAVENOUS at 09:50

## 2023-10-10 RX ADMIN — PROPOFOL 20 MG: 10 INJECTION, EMULSION INTRAVENOUS at 10:08

## 2023-10-10 RX ADMIN — PROPOFOL 20 MG: 10 INJECTION, EMULSION INTRAVENOUS at 10:16

## 2023-10-10 RX ADMIN — PROPOFOL 20 MG: 10 INJECTION, EMULSION INTRAVENOUS at 10:14

## 2023-10-10 RX ADMIN — PROPOFOL 30 MG: 10 INJECTION, EMULSION INTRAVENOUS at 10:12

## 2023-10-10 RX ADMIN — SODIUM CHLORIDE 20 ML/HR: 9 INJECTION, SOLUTION INTRAVENOUS at 09:30

## 2023-10-10 SDOH — HEALTH STABILITY: MENTAL HEALTH: CURRENT SMOKER: 0

## 2023-10-10 ASSESSMENT — PAIN SCALES - GENERAL
PAIN_LEVEL: 0
PAINLEVEL_OUTOF10: 0 - NO PAIN
PAINLEVEL_OUTOF10: 0 - NO PAIN

## 2023-10-10 ASSESSMENT — COLUMBIA-SUICIDE SEVERITY RATING SCALE - C-SSRS
6. HAVE YOU EVER DONE ANYTHING, STARTED TO DO ANYTHING, OR PREPARED TO DO ANYTHING TO END YOUR LIFE?: NO
2. HAVE YOU ACTUALLY HAD ANY THOUGHTS OF KILLING YOURSELF?: NO
1. IN THE PAST MONTH, HAVE YOU WISHED YOU WERE DEAD OR WISHED YOU COULD GO TO SLEEP AND NOT WAKE UP?: NO

## 2023-10-10 ASSESSMENT — PAIN - FUNCTIONAL ASSESSMENT: PAIN_FUNCTIONAL_ASSESSMENT: 0-10

## 2023-10-10 NOTE — ANESTHESIA POSTPROCEDURE EVALUATION
Patient: Tonja Castillo    Procedure Summary       Date: 10/10/23 Room / Location: [facility]    Anesthesia Start: 0950 Anesthesia Stop: 1030    Procedure: COLONOSCOPY Diagnosis:       Encounter for screening for malignant neoplasm of colon      Encounter for screening for malignant neoplasm of colon    Scheduled Providers: Coral Lockhart MD Responsible Provider: NANDO Ruiz    Anesthesia Type: MAC ASA Status: 3            Anesthesia Type: MAC    Vitals Value Taken Time   /68 10/10/23 1027   Temp 36.5 °C (97.7 °F) 10/10/23 1027   Pulse 75 10/10/23 1027   Resp 14 10/10/23 1027   SpO2 97 % 10/10/23 1027       Anesthesia Post Evaluation    Patient location during evaluation: PACU  Patient participation: complete - patient participated  Level of consciousness: sleepy but conscious and responsive to verbal stimuli  Pain score: 0  Pain management: adequate  Airway patency: patent  Cardiovascular status: acceptable, hemodynamically stable and blood pressure returned to baseline  Respiratory status: acceptable, spontaneous ventilation and nasal cannula  Hydration status: acceptable        There were no known notable events for this encounter.

## 2023-10-10 NOTE — H&P
History Of Present Illness  Tonja Castillo is a 78 y.o. female presenting with Here for colonoscopy.   Also with ventral hernia  to be repaired.    Past Medical History  Past Medical History:   Diagnosis Date    Anemia     Asthma     Delayed emergence from general anesthesia     Depression     Diabetes mellitus (CMS/HCC)     Diarrhea     GERD (gastroesophageal reflux disease)     Hyperlipidemia     Hypertension     Personal history of other benign neoplasm     History of benign neoplasm of colon    Personal history of other endocrine, nutritional and metabolic disease     History of hyperlipidemia    Retinal detachment with single break, unspecified eye     Partial recent retinal detachment with single defect    Sleep apnea     Vitreous degeneration, unspecified eye     Vitreous degeneration       Surgical History  Past Surgical History:   Procedure Laterality Date    OTHER SURGICAL HISTORY  2020    Hemicolectomy    OTHER SURGICAL HISTORY  2020    Colon surgery    OTHER SURGICAL HISTORY  2020    Hip replacement    OTHER SURGICAL HISTORY  2020     section    OTHER SURGICAL HISTORY  2020    Cholecystectomy    OTHER SURGICAL HISTORY  2020    Amputation    OTHER SURGICAL HISTORY  2020    Colonoscopy    OTHER SURGICAL HISTORY  2020    Toe tenotomy        Social History  She reports that she has never smoked. She has never been exposed to tobacco smoke. She has never used smokeless tobacco. She reports current alcohol use of about 1.0 standard drink of alcohol per week. She reports that she does not use drugs.    Family History  Family History   Problem Relation Name Age of Onset    Hypertension Mother      Coronary artery disease Mother      Diabetes Father      Coronary artery disease Father      Hypertension Father          Allergies  Bacitracin, Lisinopril, and Codeine    Review of Systems     Physical Exam  Eyes:      Extraocular Movements: Extraocular movements  "intact.      Pupils: Pupils are equal, round, and reactive to light.   Cardiovascular:      Rate and Rhythm: Normal rate and regular rhythm.   Pulmonary:      Effort: Pulmonary effort is normal.   Abdominal:      Palpations: Abdomen is soft.   Skin:     General: Skin is warm and dry.   Neurological:      Mental Status: She is alert.   Psychiatric:         Mood and Affect: Mood normal.         Behavior: Behavior normal.          Last Recorded Vitals  Blood pressure (!) 159/107, pulse 86, temperature 36.2 °C (97.2 °F), temperature source Temporal, resp. rate 18, height 1.638 m (5' 4.5\"), weight 71.7 kg (158 lb), SpO2 96 %.    Relevant Results             Assessment/Plan   Active Problems:  There are no active Hospital Problems.      Here for colonoscopy.  With ventral hernia to be repaired.       I spent 20 minutes in the professional and overall care of this patient.      Coral Lockhart MD    "

## 2023-10-10 NOTE — ANESTHESIA PREPROCEDURE EVALUATION
Patient: Tonja Castillo    Procedure Information       Date/Time: 10/10/23 0930    Scheduled providers: Coral Lockhart MD    Procedure: COLONOSCOPY    Location: [facility]            Relevant Problems   Anesthesia   (+) Delayed emergence from anesthesia      Cardiovascular   (+) Atypical chest pain   (+) Benign essential hypertension   (+) Chest pain   (+) Hyperlipidemia   (+) Systolic ejection murmur      Endocrine   (+) Diabetes mellitus type 2 in obese (CMS/HCC)   (+) Hypothyroidism   (+) Neuropathy in diabetes (CMS/HCC)   (+) Type 2 diabetes mellitus with carpal tunnel syndrome (CMS/HCC)      GI   (+) Colon cancer (CMS/HCC)   (+) GERD (gastroesophageal reflux disease)   (+) Malignant neoplasm of ascending colon (CMS/HCC)      /Renal   (+) Renal cysts, acquired, bilateral      Neuro/Psych   (+) Carpal tunnel syndrome, right   (+) Depression   (+) Major depressive disorder, single episode   (+) Sciatica      Pulmonary   (+) Acute exacerbation of moderate persistent extrinsic asthma   (+) Asthma   (+) Exacerbation of asthma   (+) Moderate persistent asthma   (+) Obstructive sleep apnea syndrome in adult      GI/Hepatic   (+) Colon cancer (CMS/HCC)   (+) Malignant neoplasm of ascending colon (CMS/HCC)      Hematology   (+) Anemia   (+) Iron deficiency anemia      Musculoskeletal   (+) Carpal tunnel syndrome, right   (+) Displacement of lumbar intervertebral disc without myelopathy   (+) Primary osteoarthritis of left knee      Infectious Disease   (+) URI, acute      Other   (+) Inflammation of right sacroiliac joint (CMS/HCC)       Clinical information reviewed:   Tobacco  Allergies  Meds   Med Hx  Surg Hx   Fam Hx  Soc Hx        NPO Detail:  NPO/Void Status  Date of Last Liquid: 10/10/23  Time of Last Liquid: 0700  Date of Last Solid: 10/08/23  Last Intake Type: Clear fluids         Physical Exam    Airway  Mallampati: I  TM distance: >3 FB  Neck ROM: full     Cardiovascular     Dental - normal exam     Pulmonary    Abdominal            Anesthesia Plan    ASA 3     MAC     The patient is not a current smoker.    intravenous induction   Anesthetic plan and risks discussed with patient.  Use of blood products discussed with patient who.    Plan discussed with CRNA.

## 2023-10-11 ENCOUNTER — HOSPITAL ENCOUNTER (OUTPATIENT)
Facility: HOSPITAL | Age: 78
Setting detail: OUTPATIENT SURGERY
Discharge: HOME | End: 2023-10-11
Attending: SURGERY | Admitting: SURGERY
Payer: MEDICARE

## 2023-10-11 ENCOUNTER — ANESTHESIA (OUTPATIENT)
Dept: OPERATING ROOM | Facility: HOSPITAL | Age: 78
End: 2023-10-11
Payer: MEDICARE

## 2023-10-11 ENCOUNTER — HOSPITAL ENCOUNTER (OUTPATIENT)
Dept: CARDIOLOGY | Facility: HOSPITAL | Age: 78
Discharge: HOME | End: 2023-10-11
Payer: MEDICARE

## 2023-10-11 ENCOUNTER — PHARMACY VISIT (OUTPATIENT)
Dept: PHARMACY | Facility: CLINIC | Age: 78
End: 2023-10-11
Payer: MEDICARE

## 2023-10-11 VITALS
OXYGEN SATURATION: 100 % | WEIGHT: 166 LBS | SYSTOLIC BLOOD PRESSURE: 135 MMHG | HEIGHT: 65 IN | BODY MASS INDEX: 27.66 KG/M2 | TEMPERATURE: 98 F | RESPIRATION RATE: 18 BRPM | HEART RATE: 76 BPM | DIASTOLIC BLOOD PRESSURE: 72 MMHG

## 2023-10-11 DIAGNOSIS — K43.9 VENTRAL HERNIA WITHOUT OBSTRUCTION OR GANGRENE: Primary | ICD-10-CM

## 2023-10-11 LAB
ATRIAL RATE: 67 BPM
GLUCOSE BLD MANUAL STRIP-MCNC: 94 MG/DL (ref 74–99)
P AXIS: 37 DEGREES
PR INTERVAL: 183 MS
Q ONSET: 249 MS
QRS COUNT: 11 BEATS
QRS DURATION: 114 MS
QT INTERVAL: 417 MS
QTC CALCULATION(BAZETT): 441 MS
QTC FREDERICIA: 432 MS
R AXIS: -5 DEGREES
T AXIS: 11 DEGREES
T OFFSET: 458 MS
VENTRICULAR RATE: 67 BPM

## 2023-10-11 PROCEDURE — 82947 ASSAY GLUCOSE BLOOD QUANT: CPT

## 2023-10-11 PROCEDURE — 7100000001 HC RECOVERY ROOM TIME - INITIAL BASE CHARGE: Performed by: SURGERY

## 2023-10-11 PROCEDURE — 7100000002 HC RECOVERY ROOM TIME - EACH INCREMENTAL 1 MINUTE: Performed by: SURGERY

## 2023-10-11 PROCEDURE — RXMED WILLOW AMBULATORY MEDICATION CHARGE

## 2023-10-11 PROCEDURE — 2500000005 HC RX 250 GENERAL PHARMACY W/O HCPCS: Performed by: NURSE ANESTHETIST, CERTIFIED REGISTERED

## 2023-10-11 PROCEDURE — 3700000001 HC GENERAL ANESTHESIA TIME - INITIAL BASE CHARGE: Performed by: SURGERY

## 2023-10-11 PROCEDURE — 3600000003 HC OR TIME - INITIAL BASE CHARGE - PROCEDURE LEVEL THREE: Performed by: SURGERY

## 2023-10-11 PROCEDURE — 93005 ELECTROCARDIOGRAM TRACING: CPT

## 2023-10-11 PROCEDURE — 7100000009 HC PHASE TWO TIME - INITIAL BASE CHARGE: Performed by: SURGERY

## 2023-10-11 PROCEDURE — 2500000004 HC RX 250 GENERAL PHARMACY W/ HCPCS (ALT 636 FOR OP/ED): Performed by: SURGERY

## 2023-10-11 PROCEDURE — 7100000010 HC PHASE TWO TIME - EACH INCREMENTAL 1 MINUTE: Performed by: SURGERY

## 2023-10-11 PROCEDURE — 93010 ELECTROCARDIOGRAM REPORT: CPT | Performed by: INTERNAL MEDICINE

## 2023-10-11 PROCEDURE — 2500000004 HC RX 250 GENERAL PHARMACY W/ HCPCS (ALT 636 FOR OP/ED): Performed by: NURSE ANESTHETIST, CERTIFIED REGISTERED

## 2023-10-11 PROCEDURE — 2500000001 HC RX 250 WO HCPCS SELF ADMINISTERED DRUGS (ALT 637 FOR MEDICARE OP): Performed by: ANESTHESIOLOGY

## 2023-10-11 PROCEDURE — 3700000002 HC GENERAL ANESTHESIA TIME - EACH INCREMENTAL 1 MINUTE: Performed by: SURGERY

## 2023-10-11 PROCEDURE — 2500000004 HC RX 250 GENERAL PHARMACY W/ HCPCS (ALT 636 FOR OP/ED): Performed by: ANESTHESIOLOGY

## 2023-10-11 PROCEDURE — 3600000008 HC OR TIME - EACH INCREMENTAL 1 MINUTE - PROCEDURE LEVEL THREE: Performed by: SURGERY

## 2023-10-11 PROCEDURE — 2500000005 HC RX 250 GENERAL PHARMACY W/O HCPCS: Performed by: SURGERY

## 2023-10-11 RX ORDER — ONDANSETRON HYDROCHLORIDE 2 MG/ML
4 INJECTION, SOLUTION INTRAVENOUS ONCE
Status: COMPLETED | OUTPATIENT
Start: 2023-10-11 | End: 2023-10-11

## 2023-10-11 RX ORDER — SODIUM CITRATE AND CITRIC ACID MONOHYDRATE 334; 500 MG/5ML; MG/5ML
30 SOLUTION ORAL ONCE
Status: COMPLETED | OUTPATIENT
Start: 2023-10-11 | End: 2023-10-11

## 2023-10-11 RX ORDER — FENTANYL CITRATE 50 UG/ML
INJECTION, SOLUTION INTRAMUSCULAR; INTRAVENOUS AS NEEDED
Status: DISCONTINUED | OUTPATIENT
Start: 2023-10-11 | End: 2023-10-11

## 2023-10-11 RX ORDER — MORPHINE SULFATE 4 MG/ML
4 INJECTION INTRAVENOUS EVERY 5 MIN PRN
Status: DISCONTINUED | OUTPATIENT
Start: 2023-10-11 | End: 2023-10-11 | Stop reason: HOSPADM

## 2023-10-11 RX ORDER — BUPIVACAINE HYDROCHLORIDE 2.5 MG/ML
INJECTION, SOLUTION INFILTRATION; PERINEURAL AS NEEDED
Status: DISCONTINUED | OUTPATIENT
Start: 2023-10-11 | End: 2023-10-11 | Stop reason: HOSPADM

## 2023-10-11 RX ORDER — CEFAZOLIN SODIUM 1 G/50ML
1 SOLUTION INTRAVENOUS ONCE
Status: COMPLETED | OUTPATIENT
Start: 2023-10-11 | End: 2023-10-11

## 2023-10-11 RX ORDER — OXYCODONE AND ACETAMINOPHEN 5; 325 MG/1; MG/1
1 TABLET ORAL EVERY 6 HOURS PRN
Qty: 10 TABLET | Refills: 0 | Status: SHIPPED | OUTPATIENT
Start: 2023-10-11 | End: 2023-11-13 | Stop reason: ALTCHOICE

## 2023-10-11 RX ORDER — SODIUM CHLORIDE 9 MG/ML
50 INJECTION, SOLUTION INTRAVENOUS CONTINUOUS
Status: DISCONTINUED | OUTPATIENT
Start: 2023-10-11 | End: 2023-10-11 | Stop reason: HOSPADM

## 2023-10-11 RX ORDER — METOCLOPRAMIDE HYDROCHLORIDE 5 MG/ML
10 INJECTION INTRAMUSCULAR; INTRAVENOUS ONCE
Status: COMPLETED | OUTPATIENT
Start: 2023-10-11 | End: 2023-10-11

## 2023-10-11 RX ORDER — CEFAZOLIN 1 G/1
INJECTION, POWDER, FOR SOLUTION INTRAVENOUS AS NEEDED
Status: DISCONTINUED | OUTPATIENT
Start: 2023-10-11 | End: 2023-10-11

## 2023-10-11 RX ORDER — FAMOTIDINE 10 MG/ML
20 INJECTION INTRAVENOUS ONCE
Status: COMPLETED | OUTPATIENT
Start: 2023-10-11 | End: 2023-10-11

## 2023-10-11 RX ORDER — ROCURONIUM BROMIDE 10 MG/ML
INJECTION, SOLUTION INTRAVENOUS AS NEEDED
Status: DISCONTINUED | OUTPATIENT
Start: 2023-10-11 | End: 2023-10-11

## 2023-10-11 RX ORDER — LIDOCAINE HYDROCHLORIDE 10 MG/ML
0.1 INJECTION, SOLUTION EPIDURAL; INFILTRATION; INTRACAUDAL; PERINEURAL ONCE
Status: DISCONTINUED | OUTPATIENT
Start: 2023-10-11 | End: 2023-10-11 | Stop reason: HOSPADM

## 2023-10-11 RX ORDER — LIDOCAINE HYDROCHLORIDE 20 MG/ML
INJECTION, SOLUTION EPIDURAL; INFILTRATION; INTRACAUDAL; PERINEURAL AS NEEDED
Status: DISCONTINUED | OUTPATIENT
Start: 2023-10-11 | End: 2023-10-11

## 2023-10-11 RX ORDER — PROPOFOL 10 MG/ML
INJECTION, EMULSION INTRAVENOUS AS NEEDED
Status: DISCONTINUED | OUTPATIENT
Start: 2023-10-11 | End: 2023-10-11

## 2023-10-11 RX ORDER — LIDOCAINE HCL/PF 100 MG/5ML
SYRINGE (ML) INTRAVENOUS AS NEEDED
Status: DISCONTINUED | OUTPATIENT
Start: 2023-10-11 | End: 2023-10-11

## 2023-10-11 RX ORDER — MORPHINE SULFATE 2 MG/ML
2 INJECTION, SOLUTION INTRAMUSCULAR; INTRAVENOUS EVERY 5 MIN PRN
Status: DISCONTINUED | OUTPATIENT
Start: 2023-10-11 | End: 2023-10-11 | Stop reason: HOSPADM

## 2023-10-11 RX ORDER — DEXAMETHASONE SODIUM PHOSPHATE 4 MG/ML
INJECTION, SOLUTION INTRA-ARTICULAR; INTRALESIONAL; INTRAMUSCULAR; INTRAVENOUS; SOFT TISSUE AS NEEDED
Status: DISCONTINUED | OUTPATIENT
Start: 2023-10-11 | End: 2023-10-11

## 2023-10-11 RX ADMIN — FAMOTIDINE 20 MG: 10 INJECTION, SOLUTION INTRAVENOUS at 09:35

## 2023-10-11 RX ADMIN — ROCURONIUM BROMIDE 50 MG: 50 INJECTION INTRAVENOUS at 10:37

## 2023-10-11 RX ADMIN — SODIUM CITRATE AND CITRIC ACID MONOHYDRATE 30 ML: 500; 334 SOLUTION ORAL at 09:45

## 2023-10-11 RX ADMIN — SODIUM CHLORIDE: 9 INJECTION, SOLUTION INTRAVENOUS at 10:23

## 2023-10-11 RX ADMIN — SODIUM CHLORIDE 50 ML/HR: 9 INJECTION, SOLUTION INTRAVENOUS at 09:34

## 2023-10-11 RX ADMIN — LIDOCAINE HYDROCHLORIDE 100 MG: 20 INJECTION, SOLUTION EPIDURAL; INFILTRATION; INTRACAUDAL; PERINEURAL at 10:37

## 2023-10-11 RX ADMIN — CEFAZOLIN SODIUM 1 G: 1 INJECTION, SOLUTION INTRAVENOUS at 10:28

## 2023-10-11 RX ADMIN — DEXAMETHASONE SODIUM PHOSPHATE 8 MG: 4 INJECTION, SOLUTION INTRAMUSCULAR; INTRAVENOUS at 10:59

## 2023-10-11 RX ADMIN — ONDANSETRON 4 MG: 2 INJECTION INTRAMUSCULAR; INTRAVENOUS at 09:35

## 2023-10-11 RX ADMIN — SUGAMMADEX 200 MG: 100 INJECTION, SOLUTION INTRAVENOUS at 11:32

## 2023-10-11 RX ADMIN — PROPOFOL 200 MG: 10 INJECTION, EMULSION INTRAVENOUS at 10:37

## 2023-10-11 RX ADMIN — NITROGLYCERIN 0.5 INCH: 20 OINTMENT TOPICAL at 09:35

## 2023-10-11 RX ADMIN — FENTANYL CITRATE 100 MCG: 50 INJECTION, SOLUTION INTRAMUSCULAR; INTRAVENOUS at 10:37

## 2023-10-11 RX ADMIN — METOCLOPRAMIDE 10 MG: 5 INJECTION, SOLUTION INTRAMUSCULAR; INTRAVENOUS at 09:34

## 2023-10-11 SDOH — HEALTH STABILITY: MENTAL HEALTH: CURRENT SMOKER: 0

## 2023-10-11 ASSESSMENT — PAIN - FUNCTIONAL ASSESSMENT
PAIN_FUNCTIONAL_ASSESSMENT: 0-10

## 2023-10-11 ASSESSMENT — PAIN SCALES - GENERAL
PAINLEVEL_OUTOF10: 0 - NO PAIN

## 2023-10-11 ASSESSMENT — COLUMBIA-SUICIDE SEVERITY RATING SCALE - C-SSRS
1. IN THE PAST MONTH, HAVE YOU WISHED YOU WERE DEAD OR WISHED YOU COULD GO TO SLEEP AND NOT WAKE UP?: NO
2. HAVE YOU ACTUALLY HAD ANY THOUGHTS OF KILLING YOURSELF?: NO
6. HAVE YOU EVER DONE ANYTHING, STARTED TO DO ANYTHING, OR PREPARED TO DO ANYTHING TO END YOUR LIFE?: NO

## 2023-10-11 NOTE — ANESTHESIA PREPROCEDURE EVALUATION
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)   (+) Type 2 diabetes mellitus with carpal tunnel syndrome (CMS/HCC)      GI   (+) Colon cancer (CMS/HCC)   (+) GERD (gastroesophageal reflux disease)   (+) Malignant neoplasm of ascending colon (CMS/HCC)      /Renal   (+) Renal cysts, acquired, bilateral      Neuro/Psych   (+) Carpal tunnel syndrome, right   (+) Depression   (+) Major depressive disorder, single episode   (+) Sciatica      Pulmonary   (+) Acute exacerbation of moderate persistent extrinsic asthma   (+) Asthma   (+) Exacerbation of asthma   (+) Moderate persistent asthma   (+) Obstructive sleep apnea syndrome in adult      GI/Hepatic   (+) Colon cancer (CMS/HCC)   (+) Malignant neoplasm of ascending colon (CMS/HCC)      Hematology   (+) Anemia   (+) Iron deficiency anemia      Musculoskeletal   (+) Carpal tunnel syndrome, right   (+) Displacement of lumbar intervertebral disc without myelopathy   (+) Primary osteoarthritis of left knee      Infectious Disease   (+) URI, acute      Other   (+) Inflammation of right sacroiliac joint (CMS/HCC)       Clinical information reviewed:       Med Hx             NPO Detail:  NPO/Void Status  Date of Last Liquid: 10/10/23  Time of Last Liquid: 0700  Date of Last Solid: 10/08/23  Last Intake Type: Clear fluids         Physical Exam    Airway  Mallampati: II     Cardiovascular   Rhythm: regular  Rate: normal     Dental    Pulmonary    Abdominal        Anesthesia  Plan    ASA 3     general     The patient is not a current smoker.    intravenous induction   Anesthetic plan and risks discussed with patient.    Blood Products Consent Comment: POLA General d/w pt

## 2023-10-11 NOTE — SIGNIFICANT EVENT
Pt resting quietly with call light in reach. No family available to bedside at this time. Await OR RN to bedside. Comfort measures continued

## 2023-10-11 NOTE — OP NOTE
4 CM VENTRAL HERNIA REPAIR Operative Note     Date: 10/11/2023  OR Location: [facility]    Name: Tonja Castillo, : 1945, Age: 78 y.o., MRN: [id number], Sex: female    Diagnosis  Pre-op Diagnosis     * Ventral hernia without obstruction or gangrene [K43.9] Post-op Diagnosis     * Ventral hernia without obstruction or gangrene [K43.9]     Procedures    * 4 CM VENTRAL HERNIA REPAIR    Surgeons      * Coral Lockhart - Primary    Resident/Fellow/Other Assistant:  Surgical Assistant: Daina Niño    Procedure Summary  Anesthesia: General  ASA: III  Anesthesia Staff: CRNA: NANDO Barajas  Estimated Blood Loss: 2mL  Intra-op Medications: * No intraprocedure medications in log *           Anesthesia Record               Intraprocedure I/O Totals       None           Specimen: No specimens collected     Staff:   Circulator: Lit Lion RN  Relief Circulator: Cayla Miller RN; Sundar Hamilton RN  Scrub Person: Geovani Galvan RN         Drains and/or Catheters: * None in log *    Tourniquet Times:         Implants:     Findings: 4 cm ventral hernia with out obstruction or gangrene    Indications: Tonja Castillo is an 78 y.o. female who is having surgery for Ventral hernia without obstruction or gangrene [K43.9]. 4 cm in size    The patient was seen in the preoperative area. The risks, benefits, complications, treatment options, non-operative alternatives, expected recovery and outcomes were discussed with the patient. The possibilities of reaction to medication, pulmonary aspiration, injury to surrounding structures, bleeding, recurrent infection, the need for additional procedures, failure to diagnose a condition, and creating a complication requiring transfusion or operation were discussed with the patient. The patient concurred with the proposed plan, giving informed consent.  The site of surgery was properly noted/marked if necessary per policy. The patient has been actively warmed in  preoperative area. Preoperative antibiotics have been ordered and given within 1 hours of incision. Venous thrombosis prophylaxis have been ordered including bilateral sequential compression devices    Procedure Details: Pt taken to OR placed supine.  General anesthesia administered. Endotracheally intubated. Abd prepped and draped in sterile fashion.  Incision made through previous scar. Carried down through skin and subcutaneous tissue. Hernia encountered. Adhesions taken down circumferentially.  Once the fascia was cleared.  The defect measured 4 cm in size.  It was closed with double looped PDS in running fashion. Subcutaneous tissue was irrigated, closed with 3-0 Vicryl. Skin closed with 4-0 Vicryl.  Steristrips applied. Dressing placed.  Binder placed. Pt awakened and taken to PACU in stable condition.   Complications:  None; patient tolerated the procedure well.    Disposition: PACU - hemodynamically stable.  Condition: stable         Additional Details: none    Attending Attestation: A qualified resident physician was not available.    Coral Lockhart  Phone Number: [phone]

## 2023-10-11 NOTE — ANESTHESIA POSTPROCEDURE EVALUATION
Patient: Tonja Castillo    Procedure Summary       Date: 10/11/23 Room / Location: [facility] 01 / [facility]    Anesthesia Start: 1023 Anesthesia Stop: 1146    Procedure: 4 CM VENTRAL HERNIA REPAIR Diagnosis:       Ventral hernia without obstruction or gangrene      (Ventral hernia without obstruction or gangrene [K43.9])    Surgeons: Coral Lockhart MD Responsible Provider: NANDO Barajas    Anesthesia Type: general ASA Status: 3            Anesthesia Type: general    Vitals Value Taken Time   /76 10/11/23 1147   Temp 97.6 10/11/23 1147   Pulse 76 10/11/23 1147   Resp 12 10/11/23 1147   SpO2 100 10/11/23 1147       Anesthesia Post Evaluation    Patient location during evaluation: bedside  Patient participation: complete - patient participated  Level of consciousness: awake  Pain management: adequate  Airway patency: patent  Cardiovascular status: acceptable  Respiratory status: acceptable  Hydration status: acceptable      No notable events documented.

## 2023-10-11 NOTE — INTERVAL H&P NOTE
H&P reviewed. The patient was examined and there are no changes to the H&P.  Here for ventral hernia repair today.

## 2023-10-11 NOTE — ANESTHESIA PROCEDURE NOTES
Airway  Date/Time: 10/11/2023 10:40 AM  Urgency: elective    Airway not difficult    Staffing  Performed: CRNA   Authorized by: NANDO Barajas    Performed by: NANDO Barajas  Patient location during procedure: OR    Indications and Patient Condition  Indications for airway management: anesthesia  Preoxygenated: yes  Mask difficulty assessment: 1 - vent by mask    Final Airway Details  Final airway type: endotracheal airway      Successful airway: ETT  Cuffed: yes   Successful intubation technique: video laryngoscopy  Facilitating devices/methods: intubating stylet  Endotracheal tube insertion site: oral  Blade type: cazares 3.  Cormack-Lehane Classification: grade I - full view of glottis  Placement verified by: chest auscultation, capnometry and palpation of cuff   Inital cuff pressure (cm H2O): 22  Measured from: lips  Number of attempts at approach: 1

## 2023-10-12 ENCOUNTER — TELEPHONE (OUTPATIENT)
Dept: POSTOP/PACU | Facility: HOSPITAL | Age: 78
End: 2023-10-12

## 2023-10-13 LAB
LABORATORY COMMENT REPORT: NORMAL
PATH REPORT.FINAL DX SPEC: NORMAL
PATH REPORT.GROSS SPEC: NORMAL
PATH REPORT.TOTAL CANCER: NORMAL

## 2023-11-09 ENCOUNTER — LAB (OUTPATIENT)
Dept: LAB | Facility: LAB | Age: 78
End: 2023-11-09
Payer: MEDICARE

## 2023-11-09 DIAGNOSIS — C18.9 MALIGNANT NEOPLASM OF COLON, UNSPECIFIED PART OF COLON (MULTI): ICD-10-CM

## 2023-11-09 DIAGNOSIS — E66.9 DIABETES MELLITUS TYPE 2 IN OBESE: ICD-10-CM

## 2023-11-09 DIAGNOSIS — E11.69 DIABETES MELLITUS TYPE 2 IN OBESE: ICD-10-CM

## 2023-11-09 DIAGNOSIS — J45.41 ACUTE EXACERBATION OF MODERATE PERSISTENT EXTRINSIC ASTHMA (HHS-HCC): ICD-10-CM

## 2023-11-09 DIAGNOSIS — E78.5 HYPERLIPIDEMIA, UNSPECIFIED HYPERLIPIDEMIA TYPE: ICD-10-CM

## 2023-11-09 DIAGNOSIS — E03.8 OTHER SPECIFIED HYPOTHYROIDISM: ICD-10-CM

## 2023-11-09 DIAGNOSIS — E87.6 HYPOKALEMIA: ICD-10-CM

## 2023-11-09 DIAGNOSIS — Z12.31 VISIT FOR SCREENING MAMMOGRAM: ICD-10-CM

## 2023-11-09 DIAGNOSIS — O28.0 LOW MATERNAL SERUM VITAMIN B12: ICD-10-CM

## 2023-11-09 DIAGNOSIS — M70.61 TROCHANTERIC BURSITIS OF RIGHT HIP: ICD-10-CM

## 2023-11-09 LAB
ALBUMIN SERPL BCP-MCNC: 4 G/DL (ref 3.4–5)
ALP SERPL-CCNC: 79 U/L (ref 33–136)
ALT SERPL W P-5'-P-CCNC: 12 U/L (ref 7–45)
ANION GAP SERPL CALC-SCNC: 14 MMOL/L (ref 10–20)
AST SERPL W P-5'-P-CCNC: 15 U/L (ref 9–39)
BASOPHILS # BLD AUTO: 0.06 X10*3/UL (ref 0–0.1)
BASOPHILS NFR BLD AUTO: 0.9 %
BILIRUB SERPL-MCNC: 0.5 MG/DL (ref 0–1.2)
BUN SERPL-MCNC: 16 MG/DL (ref 6–23)
CALCIUM SERPL-MCNC: 9 MG/DL (ref 8.6–10.3)
CHLORIDE SERPL-SCNC: 107 MMOL/L (ref 98–107)
CHOLEST SERPL-MCNC: 149 MG/DL (ref 0–199)
CHOLESTEROL/HDL RATIO: 2.3
CO2 SERPL-SCNC: 24 MMOL/L (ref 21–32)
CREAT SERPL-MCNC: 0.78 MG/DL (ref 0.5–1.05)
CREAT UR-MCNC: 63 MG/DL (ref 20–320)
EOSINOPHIL # BLD AUTO: 0.38 X10*3/UL (ref 0–0.4)
EOSINOPHIL NFR BLD AUTO: 5.5 %
ERYTHROCYTE [DISTWIDTH] IN BLOOD BY AUTOMATED COUNT: 13.6 % (ref 11.5–14.5)
EST. AVERAGE GLUCOSE BLD GHB EST-MCNC: 120 MG/DL
GFR SERPL CREATININE-BSD FRML MDRD: 78 ML/MIN/1.73M*2
GLUCOSE SERPL-MCNC: 91 MG/DL (ref 74–99)
HBA1C MFR BLD: 5.8 %
HCT VFR BLD AUTO: 40 % (ref 36–46)
HDLC SERPL-MCNC: 64.9 MG/DL
HGB BLD-MCNC: 12.7 G/DL (ref 12–16)
IMM GRANULOCYTES # BLD AUTO: 0.01 X10*3/UL (ref 0–0.5)
IMM GRANULOCYTES NFR BLD AUTO: 0.1 % (ref 0–0.9)
LDLC SERPL CALC-MCNC: 67 MG/DL
LYMPHOCYTES # BLD AUTO: 2.44 X10*3/UL (ref 0.8–3)
LYMPHOCYTES NFR BLD AUTO: 35.1 %
MAGNESIUM SERPL-MCNC: 1.4 MG/DL (ref 1.6–2.4)
MCH RBC QN AUTO: 29.4 PG (ref 26–34)
MCHC RBC AUTO-ENTMCNC: 31.8 G/DL (ref 32–36)
MCV RBC AUTO: 93 FL (ref 80–100)
MICROALBUMIN UR-MCNC: <7 MG/L
MICROALBUMIN/CREAT UR: NORMAL MG/G{CREAT}
MONOCYTES # BLD AUTO: 0.33 X10*3/UL (ref 0.05–0.8)
MONOCYTES NFR BLD AUTO: 4.7 %
NEUTROPHILS # BLD AUTO: 3.73 X10*3/UL (ref 1.6–5.5)
NEUTROPHILS NFR BLD AUTO: 53.7 %
NON HDL CHOLESTEROL: 84 MG/DL (ref 0–149)
NRBC BLD-RTO: 0 /100 WBCS (ref 0–0)
PLATELET # BLD AUTO: 316 X10*3/UL (ref 150–450)
POTASSIUM SERPL-SCNC: 4.1 MMOL/L (ref 3.5–5.3)
PROT SERPL-MCNC: 6.4 G/DL (ref 6.4–8.2)
RBC # BLD AUTO: 4.32 X10*6/UL (ref 4–5.2)
SODIUM SERPL-SCNC: 141 MMOL/L (ref 136–145)
T4 FREE SERPL-MCNC: 1.39 NG/DL (ref 0.61–1.12)
TRIGL SERPL-MCNC: 84 MG/DL (ref 0–149)
TSH SERPL-ACNC: 0.12 MIU/L (ref 0.44–3.98)
VIT B12 SERPL-MCNC: 665 PG/ML (ref 211–911)
VLDL: 17 MG/DL (ref 0–40)
WBC # BLD AUTO: 7 X10*3/UL (ref 4.4–11.3)

## 2023-11-09 PROCEDURE — 82652 VIT D 1 25-DIHYDROXY: CPT

## 2023-11-09 PROCEDURE — 84439 ASSAY OF FREE THYROXINE: CPT

## 2023-11-09 PROCEDURE — 83036 HEMOGLOBIN GLYCOSYLATED A1C: CPT

## 2023-11-09 PROCEDURE — 83735 ASSAY OF MAGNESIUM: CPT

## 2023-11-09 PROCEDURE — 80061 LIPID PANEL: CPT

## 2023-11-09 PROCEDURE — 84443 ASSAY THYROID STIM HORMONE: CPT

## 2023-11-09 PROCEDURE — 82607 VITAMIN B-12: CPT

## 2023-11-09 PROCEDURE — 80053 COMPREHEN METABOLIC PANEL: CPT

## 2023-11-09 PROCEDURE — 85025 COMPLETE CBC W/AUTO DIFF WBC: CPT

## 2023-11-09 PROCEDURE — 36415 COLL VENOUS BLD VENIPUNCTURE: CPT

## 2023-11-09 PROCEDURE — 82570 ASSAY OF URINE CREATININE: CPT

## 2023-11-09 PROCEDURE — 82043 UR ALBUMIN QUANTITATIVE: CPT

## 2023-11-11 LAB — 1,25(OH)2D3 SERPL-MCNC: 53.6 PG/ML (ref 19.9–79.3)

## 2023-11-13 ENCOUNTER — OFFICE VISIT (OUTPATIENT)
Dept: PRIMARY CARE | Facility: CLINIC | Age: 78
End: 2023-11-13
Payer: MEDICARE

## 2023-11-13 VITALS
BODY MASS INDEX: 26.57 KG/M2 | HEIGHT: 65 IN | OXYGEN SATURATION: 97 % | DIASTOLIC BLOOD PRESSURE: 70 MMHG | HEART RATE: 80 BPM | TEMPERATURE: 97.6 F | SYSTOLIC BLOOD PRESSURE: 120 MMHG | WEIGHT: 159.5 LBS

## 2023-11-13 DIAGNOSIS — I10 BENIGN ESSENTIAL HYPERTENSION: ICD-10-CM

## 2023-11-13 DIAGNOSIS — E53.8 VITAMIN B12 DEFICIENCY: ICD-10-CM

## 2023-11-13 DIAGNOSIS — C18.9 MALIGNANT NEOPLASM OF COLON, UNSPECIFIED PART OF COLON (MULTI): ICD-10-CM

## 2023-11-13 DIAGNOSIS — Z00.00 ROUTINE GENERAL MEDICAL EXAMINATION AT HEALTH CARE FACILITY: Primary | ICD-10-CM

## 2023-11-13 DIAGNOSIS — E55.9 VITAMIN D DEFICIENCY: ICD-10-CM

## 2023-11-13 DIAGNOSIS — E03.8 OTHER SPECIFIED HYPOTHYROIDISM: ICD-10-CM

## 2023-11-13 DIAGNOSIS — F32.5 MAJOR DEPRESSIVE DISORDER IN FULL REMISSION, UNSPECIFIED WHETHER RECURRENT (CMS-HCC): ICD-10-CM

## 2023-11-13 DIAGNOSIS — E03.9 ACQUIRED HYPOTHYROIDISM: ICD-10-CM

## 2023-11-13 DIAGNOSIS — L97.522 NON-PRESSURE CHRONIC ULCER OF OTHER PART OF LEFT FOOT WITH FAT LAYER EXPOSED (MULTI): ICD-10-CM

## 2023-11-13 DIAGNOSIS — E78.5 HYPERLIPIDEMIA, UNSPECIFIED HYPERLIPIDEMIA TYPE: ICD-10-CM

## 2023-11-13 DIAGNOSIS — I83.93 VARICOSE VEINS OF BOTH LOWER EXTREMITIES, UNSPECIFIED WHETHER COMPLICATED: ICD-10-CM

## 2023-11-13 DIAGNOSIS — Z89.421 ACQUIRED ABSENCE OF OTHER RIGHT TOE(S) (MULTI): ICD-10-CM

## 2023-11-13 DIAGNOSIS — R22.41 LOCALIZED SWELLING OF RIGHT LOWER LEG: ICD-10-CM

## 2023-11-13 DIAGNOSIS — M46.1 INFLAMMATION OF RIGHT SACROILIAC JOINT (CMS-HCC): ICD-10-CM

## 2023-11-13 DIAGNOSIS — E11.9 TYPE 2 DIABETES MELLITUS WITHOUT COMPLICATION, WITHOUT LONG-TERM CURRENT USE OF INSULIN (MULTI): ICD-10-CM

## 2023-11-13 PROCEDURE — 3074F SYST BP LT 130 MM HG: CPT | Performed by: INTERNAL MEDICINE

## 2023-11-13 PROCEDURE — 3078F DIAST BP <80 MM HG: CPT | Performed by: INTERNAL MEDICINE

## 2023-11-13 PROCEDURE — 1159F MED LIST DOCD IN RCRD: CPT | Performed by: INTERNAL MEDICINE

## 2023-11-13 PROCEDURE — 1170F FXNL STATUS ASSESSED: CPT | Performed by: INTERNAL MEDICINE

## 2023-11-13 PROCEDURE — 99214 OFFICE O/P EST MOD 30 MIN: CPT | Performed by: INTERNAL MEDICINE

## 2023-11-13 PROCEDURE — 1036F TOBACCO NON-USER: CPT | Performed by: INTERNAL MEDICINE

## 2023-11-13 PROCEDURE — G0439 PPPS, SUBSEQ VISIT: HCPCS | Performed by: INTERNAL MEDICINE

## 2023-11-13 PROCEDURE — 1160F RVW MEDS BY RX/DR IN RCRD: CPT | Performed by: INTERNAL MEDICINE

## 2023-11-13 PROCEDURE — 1126F AMNT PAIN NOTED NONE PRSNT: CPT | Performed by: INTERNAL MEDICINE

## 2023-11-13 RX ORDER — LEVOTHYROXINE SODIUM 112 UG/1
112 TABLET ORAL DAILY
Qty: 90 TABLET | Refills: 1 | Status: SHIPPED | OUTPATIENT
Start: 2023-11-13 | End: 2024-03-13 | Stop reason: SDUPTHER

## 2023-11-13 ASSESSMENT — ACTIVITIES OF DAILY LIVING (ADL)
TAKING_MEDICATION: INDEPENDENT
BATHING: INDEPENDENT
GROCERY_SHOPPING: INDEPENDENT
DOING_HOUSEWORK: INDEPENDENT
DRESSING: INDEPENDENT
MANAGING_FINANCES: INDEPENDENT

## 2023-11-13 ASSESSMENT — ENCOUNTER SYMPTOMS
DEPRESSION: 0
LOSS OF SENSATION IN FEET: 1
OCCASIONAL FEELINGS OF UNSTEADINESS: 0

## 2023-11-13 ASSESSMENT — PATIENT HEALTH QUESTIONNAIRE - PHQ9
1. LITTLE INTEREST OR PLEASURE IN DOING THINGS: NOT AT ALL
2. FEELING DOWN, DEPRESSED OR HOPELESS: NOT AT ALL
SUM OF ALL RESPONSES TO PHQ9 QUESTIONS 1 AND 2: 0

## 2023-11-13 NOTE — ASSESSMENT & PLAN NOTE
Patient has some varicosities, check a venous insufficiency study of the Les, the swelling may be due to venous insufficiency

## 2023-11-13 NOTE — PROGRESS NOTES
"Subjective   Reason for Visit: Tonja Castillo is an 78 y.o. female here for a Medicare Wellness visit.     Past Medical, Surgical, and Family History reviewed and updated in chart.    Reviewed all medications by prescribing practitioner or clinical pharmacist (such as prescriptions, OTCs, herbal therapies and supplements) and documented in the medical record.    [facility]    Follow up and Medicare wellness exam        Right calf swelling: patient has noted swelling of the right calf, it has been swelled for 4-5 months.     asthma, patient has seen pulmonary medicine, she had PFTs completed,  she states that treatment with Wixela has been very helpful. Allergy testing was completed, she is allergic to cats, patient does have 2 cats she states      foot ulcer: patient had a large callus that was trimmed by Dr Patel, patient is monitored by podiatry every 10 weeks.      hypomagnesemia: takes magnesium once daily     iron deficiency/ colon cancer: patient had right hemicolectomy in 8/2020 after colonoscopy noted colon cancer. Patient has not had any additional issues, Dr Pereira has retired. Patient is doing well now. Had follow up colonoscopy, she is to have a follow up colonoscopy in 3 years per Dr Ramirez.     mammogram screening: patient was advised to get a follow up mammogram after 6/29/2023, most recent mammogram was done at [facility], it has been completed    Hypothyroidism: patient still takes levothyroxine, she takes 125 mcg dose    Hyperlipidemia: takes atorvastatin     Patient Care Team:  Melo Benjamin MD as PCP - General (Internal Medicine)  Melo Benjamin MD as PCP - [employer] PCP     Review of Systems  otherwise negative aside from what was mentioned above in HPI.     Objective   Vitals:  /70 (BP Location: Left arm, Patient Position: Sitting, BP Cuff Size: Small adult)   Pulse 80   Temp 36.4 °C (97.6 °F)   Ht 1.638 m (5' 4.5\")   Wt 72.3 kg (159 lb 8 oz)   SpO2 97%   BMI " 26.96 kg/m²       Physical Exam    Constitutional   General appearance: Alert and in no acute distress. well developed, well nourished, overweight and pleasant    Eyes   Inspection of eyes: Sclera and conjunctiva were normal.   Ears, Nose, Mouth, and Throat   Ears: Auricles: Normal. No thyromegaly or neck masses are noted   Pulmonary   Respiratory assessment: No respiratory distress, normal respiratory rhythm and effort.    Auscultation of lungs: Abnormal.  Auscultation of the lungs revealed no wheezing, no rales or crackles were heard bilaterally. no rhonchi. no friction rub. No diminished breath sounds. no bronchial breath sounds.   Cardiovascular   Auscultation of heart: Abnormal.  The heart rate was normal. The rhythm was regular. Heart sounds: normal S1 and normal S2. A grade 2 systolic murmur was heard at the LUSB. A grade 1 systolic murmur was heard at the RLSB. Unchanged. No carotid bruits are noted, right calf/ leg is larger than the left leg, trace pitting edema is noted R>L. Varicosities are noted also   Lymphatic   Palpation of lymph nodes in neck: No cervical lymphadenopathy.   Neurologic   Cranial nerves: Nerves 2-12 were intact, no focal neuro defects. wearing a mask.   Psychiatric   Orientation: Oriented to person, place, and time.    Mood and affect: Normal.     Assessment/Plan   Problem List Items Addressed This Visit       Benign essential hypertension    Current Assessment & Plan     BP is controlled, no med changes         Relevant Orders    Comprehensive Metabolic Panel    CBC and Auto Differential    Colon cancer (CMS/HCC)    Relevant Orders    Comprehensive Metabolic Panel    CBC and Auto Differential    Hyperlipidemia    Current Assessment & Plan     Stable, recheck labs in 6 months         Relevant Orders    Comprehensive Metabolic Panel    Lipid Panel    CBC and Auto Differential    Hypothyroidism    Current Assessment & Plan     Reduce dose of levothyroxine to 112 mcg daily, recheck labs  prior to next visit         Relevant Medications    levothyroxine (Synthroid, Levoxyl) 112 mcg tablet    Other Relevant Orders    Comprehensive Metabolic Panel    CBC and Auto Differential    TSH with reflex to Free T4 if abnormal    Comprehensive Metabolic Panel    CBC and Auto Differential    Inflammation of right sacroiliac joint (CMS/HCC)    Relevant Orders    Comprehensive Metabolic Panel    CBC and Auto Differential    Diabetes mellitus (CMS/HCC)    Current Assessment & Plan     Stable, HgbA1C is under 6         Relevant Orders    Albumin , Urine Random    Comprehensive Metabolic Panel    Hemoglobin A1C    CBC and Auto Differential    Vitamin D deficiency    Relevant Orders    Comprehensive Metabolic Panel    CBC and Auto Differential    Vitamin D 25-Hydroxy,Total (for eval of Vitamin D levels)    Vitamin B12 deficiency    Relevant Orders    Comprehensive Metabolic Panel    CBC and Auto Differential    Vitamin B12    Acquired absence of other right toe(s) (CMS/HCC)    Relevant Orders    Comprehensive Metabolic Panel    CBC and Auto Differential    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 (CMS/HCC)    Current Assessment & Plan     Patient sees podiatry regularly         Relevant Orders    Comprehensive Metabolic Panel    CBC and Auto Differential    Major depressive disorder in full remission, unspecified whether recurrent (CMS/HCC)    Relevant Orders    Comprehensive Metabolic Panel    CBC and Auto Differential    Localized swelling of right lower leg    Current Assessment & Plan     Patient has some varicosities, check a venous insufficiency study of the Les, the swelling may be due to venous insufficiency         Relevant Orders    Comprehensive Metabolic Panel    CBC and Auto Differential    Vascular US lower extremity venous insufficiency bilateral    Routine general medical examination at health care facility - Primary    Current Assessment & Plan     Vaccines are up to  date, colonoscopy is up to date, check hepatitis C ab         Relevant Orders    Comprehensive Metabolic Panel    CBC and Auto Differential     Other Visit Diagnoses       Varicose veins of both lower extremities, unspecified whether complicated        Relevant Orders    Vascular US lower extremity venous insufficiency bilateral          Check labs in 6 months, check US of the Les,  Follow up in 6 months

## 2023-12-07 ENCOUNTER — HOSPITAL ENCOUNTER (OUTPATIENT)
Dept: VASCULAR MEDICINE | Facility: HOSPITAL | Age: 78
Discharge: HOME | End: 2023-12-07
Payer: MEDICARE

## 2023-12-07 DIAGNOSIS — I83.93 VARICOSE VEINS OF BOTH LOWER EXTREMITIES, UNSPECIFIED WHETHER COMPLICATED: ICD-10-CM

## 2023-12-07 DIAGNOSIS — R60.0 LOCALIZED EDEMA: ICD-10-CM

## 2023-12-07 DIAGNOSIS — R22.41 LOCALIZED SWELLING OF RIGHT LOWER LEG: ICD-10-CM

## 2023-12-07 PROCEDURE — 93970 EXTREMITY STUDY: CPT | Performed by: SURGERY

## 2023-12-07 PROCEDURE — 93970 EXTREMITY STUDY: CPT

## 2024-02-21 ENCOUNTER — TELEPHONE (OUTPATIENT)
Dept: PRIMARY CARE | Facility: CLINIC | Age: 79
End: 2024-02-21
Payer: MEDICARE

## 2024-02-21 NOTE — TELEPHONE ENCOUNTER
Pt called and stated that [facility] called her and stated she needed to call us and get refills sent over to them. She needs refills on:    atorvastatin (Lipitor) 40 mg tablet     gabapentin (Neurontin) 300 mg     potassium chloride CR (Klor-Con) 10 mEq ER tablet     montelukast (Singulair) 10 mg tablet     metFORMIN (Glucophage) 850 mg tablet     buPROPion SR (Wellbutrin SR) 150 mg 12 hr tablet     Sent to    [facility] [address] Phone: [phone]   Fax: [phone]          THEY GAVE HER A NEW FAX # [phone]

## 2024-02-22 DIAGNOSIS — F32.A DEPRESSION, UNSPECIFIED DEPRESSION TYPE: Primary | ICD-10-CM

## 2024-02-22 DIAGNOSIS — E66.9 DIABETES MELLITUS TYPE 2 IN OBESE: ICD-10-CM

## 2024-02-22 DIAGNOSIS — E78.5 HYPERLIPIDEMIA, UNSPECIFIED HYPERLIPIDEMIA TYPE: ICD-10-CM

## 2024-02-22 DIAGNOSIS — M70.61 TROCHANTERIC BURSITIS OF RIGHT HIP: ICD-10-CM

## 2024-02-22 DIAGNOSIS — E11.69 DIABETES MELLITUS TYPE 2 IN OBESE: ICD-10-CM

## 2024-02-22 DIAGNOSIS — E87.6 HYPOKALEMIA: ICD-10-CM

## 2024-02-22 DIAGNOSIS — J45.901 EXACERBATION OF ASTHMA, UNSPECIFIED ASTHMA SEVERITY, UNSPECIFIED WHETHER PERSISTENT (HHS-HCC): ICD-10-CM

## 2024-02-22 RX ORDER — POTASSIUM CHLORIDE 750 MG/1
10 TABLET, EXTENDED RELEASE ORAL DAILY
COMMUNITY
Start: 2023-11-13 | End: 2024-02-22 | Stop reason: SDUPTHER

## 2024-02-22 RX ORDER — MONTELUKAST SODIUM 10 MG/1
10 TABLET ORAL DAILY
Qty: 90 TABLET | Refills: 1 | Status: SHIPPED | OUTPATIENT
Start: 2024-02-22

## 2024-02-22 RX ORDER — METFORMIN HYDROCHLORIDE 850 MG/1
850 TABLET ORAL
Qty: 180 TABLET | Refills: 1 | Status: SHIPPED | OUTPATIENT
Start: 2024-02-22

## 2024-02-22 RX ORDER — BUPROPION HYDROCHLORIDE 150 MG/1
150 TABLET, EXTENDED RELEASE ORAL EVERY 12 HOURS
Qty: 180 TABLET | Refills: 1 | Status: SHIPPED | OUTPATIENT
Start: 2024-02-22

## 2024-02-22 RX ORDER — GABAPENTIN 300 MG/1
300 CAPSULE ORAL NIGHTLY
Qty: 90 CAPSULE | Refills: 1 | Status: SHIPPED | OUTPATIENT
Start: 2024-02-22 | End: 2024-08-20

## 2024-02-22 RX ORDER — POTASSIUM CHLORIDE 750 MG/1
10 TABLET, EXTENDED RELEASE ORAL DAILY
Qty: 90 TABLET | Refills: 1 | Status: SHIPPED | OUTPATIENT
Start: 2024-02-22

## 2024-02-22 RX ORDER — ATORVASTATIN CALCIUM 40 MG/1
40 TABLET, FILM COATED ORAL NIGHTLY
Qty: 90 TABLET | Refills: 1 | Status: SHIPPED | OUTPATIENT
Start: 2024-02-22

## 2024-03-01 ENCOUNTER — OFFICE VISIT (OUTPATIENT)
Dept: OBSTETRICS AND GYNECOLOGY | Facility: CLINIC | Age: 79
End: 2024-03-01
Payer: MEDICARE

## 2024-03-01 VITALS
SYSTOLIC BLOOD PRESSURE: 134 MMHG | DIASTOLIC BLOOD PRESSURE: 78 MMHG | BODY MASS INDEX: 26.8 KG/M2 | WEIGHT: 157 LBS | HEIGHT: 64 IN

## 2024-03-01 DIAGNOSIS — N76.4 VULVAR ABSCESS: ICD-10-CM

## 2024-03-01 DIAGNOSIS — Z12.31 ENCOUNTER FOR SCREENING MAMMOGRAM FOR MALIGNANT NEOPLASM OF BREAST: Primary | ICD-10-CM

## 2024-03-01 PROCEDURE — 1159F MED LIST DOCD IN RCRD: CPT | Performed by: OBSTETRICS & GYNECOLOGY

## 2024-03-01 PROCEDURE — 1036F TOBACCO NON-USER: CPT | Performed by: OBSTETRICS & GYNECOLOGY

## 2024-03-01 PROCEDURE — 3075F SYST BP GE 130 - 139MM HG: CPT | Performed by: OBSTETRICS & GYNECOLOGY

## 2024-03-01 PROCEDURE — 3078F DIAST BP <80 MM HG: CPT | Performed by: OBSTETRICS & GYNECOLOGY

## 2024-03-01 PROCEDURE — 1126F AMNT PAIN NOTED NONE PRSNT: CPT | Performed by: OBSTETRICS & GYNECOLOGY

## 2024-03-01 PROCEDURE — 99203 OFFICE O/P NEW LOW 30 MIN: CPT | Performed by: OBSTETRICS & GYNECOLOGY

## 2024-03-01 PROCEDURE — 1157F ADVNC CARE PLAN IN RCRD: CPT | Performed by: OBSTETRICS & GYNECOLOGY

## 2024-03-01 RX ORDER — DOXYCYCLINE 100 MG/1
100 CAPSULE ORAL 2 TIMES DAILY
Qty: 20 CAPSULE | Refills: 0 | Status: SHIPPED | OUTPATIENT
Start: 2024-03-01 | End: 2024-03-11

## 2024-03-01 NOTE — PROGRESS NOTES
Subjective   Patient ID: Tonja Castillo is a 78 y.o. female who presents for Annual Exam (Here for annual but has a labial cyst that has been draining states burns and itches in the area).  HANSA Evangelista is a 78-year-old's old  currently not sexually active presents with chief complaint of a lump in the vaginal area.  About couple of weeks ago it was painful getting bigger and bigger.  She called the office and was recommended to put heating pad until seen.  She says she has been applying the heating pad 3 times a day started noticing some leakage of pussy looking material coming out.  Recently has had some itching.  She says that the discharge was blood-tinged.  She is a diabetic but says that her blood sugars are under good control.  She denies having an episode like this in the past.    Review of Systems   All other systems reviewed and are negative.      Objective   Physical Exam  Vitals reviewed.   Constitutional:       Appearance: Normal appearance.   HENT:      Head: Normocephalic and atraumatic.      Nose: Nose normal.   Cardiovascular:      Rate and Rhythm: Normal rate and regular rhythm.   Pulmonary:      Effort: Pulmonary effort is normal.      Breath sounds: Normal breath sounds.   Chest:      Chest wall: No mass.   Breasts:     Right: Normal.      Left: Normal.   Abdominal:      General: Abdomen is flat. Bowel sounds are normal. There is no distension.      Palpations: Abdomen is soft. There is no mass.   Genitourinary:     Labia:         Left: Tenderness and lesion present.       Vagina: Normal.      Cervix: Normal.      Uterus: Normal.       Adnexa: Right adnexa normal and left adnexa normal.      Rectum: Normal.      Comments: 4-5 cm induration and lump not tender with a small head that had opened up in the past few days mild redness at 4 -5 o'clock lower than where bartholin expect to be.    Musculoskeletal:         General: Normal range of motion.      Cervical back: Normal range of motion.    Skin:     General: Skin is warm and dry.   Neurological:      General: No focal deficit present.      Mental Status: She is alert.   Psychiatric:         Mood and Affect: Mood normal.         Behavior: Behavior normal.         Assessment/Plan   Problem List Items Addressed This Visit    None  Visit Diagnoses         Codes    Encounter for screening mammogram for malignant neoplasm of breast    -  Primary Z12.31    Relevant Orders    BI mammo bilateral screening tomosynthesis    Vulvar abscess     N76.4    Relevant Medications    doxycycline (Monodox) 100 mg capsule        A mammogram order was put in for her.  Examination still shows this is most likely benign vulvar abscess not a Bartholin abscess.  I have recommended she continues with heat compresses 4 times a day and start her on doxycycline twice a day for 10 days and have her follow-up with me in 2 weeks.  This most likely will resolve on its own but if is still present I can either open this up or recommend removal operating room.         Sylvester Edmondson MD 03/01/24 1:17 PM

## 2024-03-13 ENCOUNTER — OFFICE VISIT (OUTPATIENT)
Dept: OBSTETRICS AND GYNECOLOGY | Facility: CLINIC | Age: 79
End: 2024-03-13
Payer: MEDICARE

## 2024-03-13 VITALS — BODY MASS INDEX: 25.23 KG/M2 | SYSTOLIC BLOOD PRESSURE: 146 MMHG | WEIGHT: 147 LBS | DIASTOLIC BLOOD PRESSURE: 78 MMHG

## 2024-03-13 DIAGNOSIS — N76.4 VULVAR ABSCESS: Primary | ICD-10-CM

## 2024-03-13 DIAGNOSIS — E03.8 OTHER SPECIFIED HYPOTHYROIDISM: ICD-10-CM

## 2024-03-13 PROCEDURE — 1159F MED LIST DOCD IN RCRD: CPT | Performed by: OBSTETRICS & GYNECOLOGY

## 2024-03-13 PROCEDURE — 99213 OFFICE O/P EST LOW 20 MIN: CPT | Performed by: OBSTETRICS & GYNECOLOGY

## 2024-03-13 PROCEDURE — 1157F ADVNC CARE PLAN IN RCRD: CPT | Performed by: OBSTETRICS & GYNECOLOGY

## 2024-03-13 PROCEDURE — 3077F SYST BP >= 140 MM HG: CPT | Performed by: OBSTETRICS & GYNECOLOGY

## 2024-03-13 PROCEDURE — 3078F DIAST BP <80 MM HG: CPT | Performed by: OBSTETRICS & GYNECOLOGY

## 2024-03-13 PROCEDURE — 1036F TOBACCO NON-USER: CPT | Performed by: OBSTETRICS & GYNECOLOGY

## 2024-03-13 RX ORDER — LEVOTHYROXINE SODIUM 112 UG/1
112 TABLET ORAL DAILY
Qty: 90 TABLET | Refills: 1 | Status: SHIPPED | OUTPATIENT
Start: 2024-03-13 | End: 2024-09-09

## 2024-03-13 NOTE — PROGRESS NOTES
Subjective   Patient ID: Tonja Castillo is a 78 y.o. female who presents for No chief complaint on file..  HPI the patient is 78 years old with  Vulvar abscess was treated with doxycycline and warm compresses is here for follow-up.  She says that she is feeling a lot better.  She denies any drainage.  She denies any pain.  She says she can still feel the lump but is much smaller and is back to the normal size.  She reports that for the last few years she has felt this lump on her left side but this got progressively bigger and painful and she came in to see me last time.  She feels that this is back to the baseline.  She denies any vaginal bleeding.    Review of Systems   All other systems reviewed and are negative.      Objective   Physical Exam  Constitutional:       Appearance: Normal appearance.   Pulmonary:      Effort: Pulmonary effort is normal.   Genitourinary:     General: Normal vulva.      Comments: Small lump felt left labia ? Bartholin non tender ? Induration and thick tissue.     Neurological:      Mental Status: She is alert.   Psychiatric:         Mood and Affect: Mood normal.         Assessment/Plan   Problem List Items Addressed This Visit    None  Visit Diagnoses         Codes    Vulvar abscess    -  Primary N76.4        Resolved.   She has no pain no drainage.  She is back to her baseline as far as left vulvar abscess questionable Bartholin mass.  I have recommended that she returns in 6 months for another examination.  This most likely is benign.  I recommended if she has a recurrent infection to proceed with exam under anesthesia with excision of this mass or Bartholin duct.  Patient agrees.         Sylvester Edmondson MD 03/13/24 4:33 PM

## 2024-03-28 DIAGNOSIS — E83.42 HYPOMAGNESEMIA: ICD-10-CM

## 2024-03-28 RX ORDER — LANOLIN ALCOHOL/MO/W.PET/CERES
CREAM (GRAM) TOPICAL
Qty: 90 TABLET | Refills: 1 | Status: SHIPPED | OUTPATIENT
Start: 2024-03-28

## 2024-05-17 ENCOUNTER — OFFICE VISIT (OUTPATIENT)
Dept: PULMONOLOGY | Facility: HOSPITAL | Age: 79
End: 2024-05-17
Payer: MEDICARE

## 2024-05-17 VITALS
BODY MASS INDEX: 28.67 KG/M2 | HEART RATE: 79 BPM | HEIGHT: 63 IN | DIASTOLIC BLOOD PRESSURE: 76 MMHG | TEMPERATURE: 96.9 F | RESPIRATION RATE: 16 BRPM | OXYGEN SATURATION: 99 % | SYSTOLIC BLOOD PRESSURE: 144 MMHG | WEIGHT: 161.8 LBS

## 2024-05-17 DIAGNOSIS — J45.909 ASTHMA, UNSPECIFIED ASTHMA SEVERITY, UNSPECIFIED WHETHER COMPLICATED, UNSPECIFIED WHETHER PERSISTENT (HHS-HCC): Primary | ICD-10-CM

## 2024-05-17 PROCEDURE — 99212 OFFICE O/P EST SF 10 MIN: CPT | Performed by: NURSE PRACTITIONER

## 2024-05-17 ASSESSMENT — ENCOUNTER SYMPTOMS
COUGH: 0
WHEEZING: 0
RHINORRHEA: 0
FATIGUE: 0
FEVER: 0
CHILLS: 0
SHORTNESS OF BREATH: 0
UNEXPECTED WEIGHT CHANGE: 0

## 2024-05-17 NOTE — PATIENT INSTRUCTIONS
Continue albuterol if needed.   Call with any questions or concerns.   Follow up with me as needed.

## 2024-05-17 NOTE — PROGRESS NOTES
Subjective   Patient ID: Tonja Castillo is a 79 y.o. female who presents for follow up COPD.     HPI: Patient reports having hx of childhood asthma was well controlled with prn albuterol. She says she had an asthma exacerbation in 2016 but was mild and recovered. However, she had an exacerbation in Dec 2022 and was taking nebulizer and prn albuterol but did not get better. She saw Dr. Benjamin on Jan 24, 23 and was started on wixela which has resolved her symptoms completely. She did not need prednisone but did get atbx in Dec 2022. She is currently asymptomatic.      Today she is here for follow up. She states that she was able to stop Wixela last year and has not had any worsening of her breathing. She has not needed to use albuterol. Allergies are also currently controlled. She has no other concerns today.     Review of Systems   Constitutional:  Negative for chills, fatigue, fever and unexpected weight change.   HENT:  Positive for congestion. Negative for postnasal drip and rhinorrhea.    Respiratory:  Negative for cough (denies hemoptysis.), shortness of breath and wheezing.    Cardiovascular:  Negative for chest pain and leg swelling.   All other systems reviewed and are negative.      Objective   Physical Exam  Vitals reviewed.   Constitutional:       Appearance: Normal appearance.   HENT:      Head: Normocephalic.   Cardiovascular:      Rate and Rhythm: Normal rate and regular rhythm.   Pulmonary:      Effort: Pulmonary effort is normal.      Breath sounds: Normal breath sounds.   Skin:     General: Skin is warm and dry.   Neurological:      Mental Status: She is alert.         Assessment/Plan   1. Moderate persistent asthma  2. Allergic rhinitis     Plan:     Patient has long standing history of bronchial asthma since childhood. She has had a mild exacerbation around 2016 and again had moderate exacerbation with prolonged residual symptoms of hyperreactivity in Dec 2022.     -PFTs were normal.   -She  was able to stop Wixela altogether last year without worsening of her breathing. She has no needed albuterol.   -Check IgE 47, RAST + for dogs and cats. Eos 330. She takes OTC antihistamine daily and symptoms are usually controlled. Recommend adding fluticasone nasal spray if symptoms worsen.      Overall she is doing well off of inhalers, I will see her back on an as needed basis if breathing worsens. Patient agreeable to plan of care.

## 2024-05-24 ENCOUNTER — LAB (OUTPATIENT)
Dept: LAB | Facility: LAB | Age: 79
End: 2024-05-24
Payer: MEDICARE

## 2024-05-24 DIAGNOSIS — E53.8 VITAMIN B12 DEFICIENCY: ICD-10-CM

## 2024-05-24 DIAGNOSIS — Z89.421 ACQUIRED ABSENCE OF OTHER RIGHT TOE(S) (MULTI): ICD-10-CM

## 2024-05-24 DIAGNOSIS — R22.41 LOCALIZED SWELLING OF RIGHT LOWER LEG: ICD-10-CM

## 2024-05-24 DIAGNOSIS — E03.8 OTHER SPECIFIED HYPOTHYROIDISM: ICD-10-CM

## 2024-05-24 DIAGNOSIS — E55.9 VITAMIN D DEFICIENCY: ICD-10-CM

## 2024-05-24 DIAGNOSIS — F32.5 MAJOR DEPRESSIVE DISORDER IN FULL REMISSION, UNSPECIFIED WHETHER RECURRENT (CMS-HCC): ICD-10-CM

## 2024-05-24 DIAGNOSIS — I10 BENIGN ESSENTIAL HYPERTENSION: ICD-10-CM

## 2024-05-24 DIAGNOSIS — C18.9 MALIGNANT NEOPLASM OF COLON, UNSPECIFIED PART OF COLON (MULTI): ICD-10-CM

## 2024-05-24 DIAGNOSIS — Z00.00 ROUTINE GENERAL MEDICAL EXAMINATION AT HEALTH CARE FACILITY: ICD-10-CM

## 2024-05-24 DIAGNOSIS — E03.9 ACQUIRED HYPOTHYROIDISM: ICD-10-CM

## 2024-05-24 DIAGNOSIS — E78.5 HYPERLIPIDEMIA, UNSPECIFIED HYPERLIPIDEMIA TYPE: ICD-10-CM

## 2024-05-24 DIAGNOSIS — L97.522 NON-PRESSURE CHRONIC ULCER OF OTHER PART OF LEFT FOOT WITH FAT LAYER EXPOSED (MULTI): ICD-10-CM

## 2024-05-24 DIAGNOSIS — M46.1 INFLAMMATION OF RIGHT SACROILIAC JOINT (CMS-HCC): ICD-10-CM

## 2024-05-24 DIAGNOSIS — E11.9 TYPE 2 DIABETES MELLITUS WITHOUT COMPLICATION, WITHOUT LONG-TERM CURRENT USE OF INSULIN (MULTI): ICD-10-CM

## 2024-05-24 LAB
25(OH)D3 SERPL-MCNC: 78 NG/ML (ref 30–100)
ALBUMIN SERPL BCP-MCNC: 4.1 G/DL (ref 3.4–5)
ALP SERPL-CCNC: 69 U/L (ref 33–136)
ALT SERPL W P-5'-P-CCNC: 24 U/L (ref 7–45)
ANION GAP SERPL CALC-SCNC: 10 MMOL/L (ref 10–20)
AST SERPL W P-5'-P-CCNC: 21 U/L (ref 9–39)
BASOPHILS # BLD AUTO: 0.08 X10*3/UL (ref 0–0.1)
BASOPHILS NFR BLD AUTO: 1.1 %
BILIRUB SERPL-MCNC: 0.6 MG/DL (ref 0–1.2)
BUN SERPL-MCNC: 16 MG/DL (ref 6–23)
CALCIUM SERPL-MCNC: 9.2 MG/DL (ref 8.6–10.3)
CHLORIDE SERPL-SCNC: 106 MMOL/L (ref 98–107)
CHOLEST SERPL-MCNC: 162 MG/DL (ref 0–199)
CHOLESTEROL/HDL RATIO: 1.9
CO2 SERPL-SCNC: 29 MMOL/L (ref 21–32)
CREAT SERPL-MCNC: 0.84 MG/DL (ref 0.5–1.05)
CREAT UR-MCNC: 57.9 MG/DL (ref 20–320)
EGFRCR SERPLBLD CKD-EPI 2021: 71 ML/MIN/1.73M*2
EOSINOPHIL # BLD AUTO: 0.24 X10*3/UL (ref 0–0.4)
EOSINOPHIL NFR BLD AUTO: 3.3 %
ERYTHROCYTE [DISTWIDTH] IN BLOOD BY AUTOMATED COUNT: 14.6 % (ref 11.5–14.5)
EST. AVERAGE GLUCOSE BLD GHB EST-MCNC: 120 MG/DL
GLUCOSE SERPL-MCNC: 89 MG/DL (ref 74–99)
HBA1C MFR BLD: 5.8 %
HCT VFR BLD AUTO: 39 % (ref 36–46)
HCV AB SER QL: NONREACTIVE
HDLC SERPL-MCNC: 83.4 MG/DL
HGB BLD-MCNC: 12.3 G/DL (ref 12–16)
IMM GRANULOCYTES # BLD AUTO: 0.02 X10*3/UL (ref 0–0.5)
IMM GRANULOCYTES NFR BLD AUTO: 0.3 % (ref 0–0.9)
LDLC SERPL CALC-MCNC: 62 MG/DL
LYMPHOCYTES # BLD AUTO: 2.41 X10*3/UL (ref 0.8–3)
LYMPHOCYTES NFR BLD AUTO: 33.1 %
MCH RBC QN AUTO: 29.4 PG (ref 26–34)
MCHC RBC AUTO-ENTMCNC: 31.5 G/DL (ref 32–36)
MCV RBC AUTO: 93 FL (ref 80–100)
MICROALBUMIN UR-MCNC: <7 MG/L
MICROALBUMIN/CREAT UR: NORMAL MG/G{CREAT}
MONOCYTES # BLD AUTO: 0.43 X10*3/UL (ref 0.05–0.8)
MONOCYTES NFR BLD AUTO: 5.9 %
NEUTROPHILS # BLD AUTO: 4.1 X10*3/UL (ref 1.6–5.5)
NEUTROPHILS NFR BLD AUTO: 56.3 %
NON HDL CHOLESTEROL: 79 MG/DL (ref 0–149)
NRBC BLD-RTO: 0 /100 WBCS (ref 0–0)
PLATELET # BLD AUTO: 338 X10*3/UL (ref 150–450)
POTASSIUM SERPL-SCNC: 3.9 MMOL/L (ref 3.5–5.3)
PROT SERPL-MCNC: 6.7 G/DL (ref 6.4–8.2)
RBC # BLD AUTO: 4.18 X10*6/UL (ref 4–5.2)
SODIUM SERPL-SCNC: 141 MMOL/L (ref 136–145)
TRIGL SERPL-MCNC: 84 MG/DL (ref 0–149)
TSH SERPL-ACNC: 3.08 MIU/L (ref 0.44–3.98)
VIT B12 SERPL-MCNC: 686 PG/ML (ref 211–911)
VLDL: 17 MG/DL (ref 0–40)
WBC # BLD AUTO: 7.3 X10*3/UL (ref 4.4–11.3)

## 2024-05-24 PROCEDURE — 86803 HEPATITIS C AB TEST: CPT

## 2024-05-24 PROCEDURE — 84443 ASSAY THYROID STIM HORMONE: CPT

## 2024-05-24 PROCEDURE — 80053 COMPREHEN METABOLIC PANEL: CPT

## 2024-05-24 PROCEDURE — 83036 HEMOGLOBIN GLYCOSYLATED A1C: CPT

## 2024-05-24 PROCEDURE — 36415 COLL VENOUS BLD VENIPUNCTURE: CPT

## 2024-05-24 PROCEDURE — 82570 ASSAY OF URINE CREATININE: CPT

## 2024-05-24 PROCEDURE — 85025 COMPLETE CBC W/AUTO DIFF WBC: CPT

## 2024-05-24 PROCEDURE — 82607 VITAMIN B-12: CPT

## 2024-05-24 PROCEDURE — 82306 VITAMIN D 25 HYDROXY: CPT

## 2024-05-24 PROCEDURE — 80061 LIPID PANEL: CPT

## 2024-05-24 PROCEDURE — 82043 UR ALBUMIN QUANTITATIVE: CPT

## 2024-05-29 ENCOUNTER — OFFICE VISIT (OUTPATIENT)
Dept: PRIMARY CARE | Facility: CLINIC | Age: 79
End: 2024-05-29
Payer: MEDICARE

## 2024-05-29 VITALS
HEIGHT: 63 IN | WEIGHT: 162.6 LBS | BODY MASS INDEX: 28.81 KG/M2 | SYSTOLIC BLOOD PRESSURE: 155 MMHG | DIASTOLIC BLOOD PRESSURE: 83 MMHG | TEMPERATURE: 97.6 F | HEART RATE: 80 BPM | RESPIRATION RATE: 16 BRPM | OXYGEN SATURATION: 96 %
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
## 2024-05-29 DIAGNOSIS — E66.9 TYPE 2 DIABETES MELLITUS WITH OBESITY (MULTI): ICD-10-CM

## 2024-05-29 DIAGNOSIS — E11.69 TYPE 2 DIABETES MELLITUS WITH OBESITY (MULTI): ICD-10-CM

## 2024-05-29 DIAGNOSIS — R22.41 LOCALIZED SWELLING OF RIGHT LOWER LEG: ICD-10-CM

## 2024-05-29 DIAGNOSIS — I10 BENIGN ESSENTIAL HYPERTENSION: ICD-10-CM

## 2024-05-29 DIAGNOSIS — F32.5 MAJOR DEPRESSIVE DISORDER IN FULL REMISSION, UNSPECIFIED WHETHER RECURRENT (CMS-HCC): ICD-10-CM

## 2024-05-29 DIAGNOSIS — E03.9 ACQUIRED HYPOTHYROIDISM: ICD-10-CM

## 2024-05-29 DIAGNOSIS — C18.9 MALIGNANT NEOPLASM OF COLON, UNSPECIFIED PART OF COLON (MULTI): ICD-10-CM

## 2024-05-29 DIAGNOSIS — Z89.421 ACQUIRED ABSENCE OF OTHER RIGHT TOE(S) (MULTI): ICD-10-CM

## 2024-05-29 DIAGNOSIS — E11.9 TYPE 2 DIABETES MELLITUS WITHOUT COMPLICATION, WITHOUT LONG-TERM CURRENT USE OF INSULIN (MULTI): ICD-10-CM

## 2024-05-29 DIAGNOSIS — K43.9 VENTRAL HERNIA WITHOUT OBSTRUCTION OR GANGRENE: Primary | ICD-10-CM

## 2024-05-29 DIAGNOSIS — E55.9 VITAMIN D DEFICIENCY: ICD-10-CM

## 2024-05-29 PROBLEM — M16.10 PRIMARY LOCALIZED OSTEOARTHRITIS OF PELVIC REGION AND THIGH: Status: ACTIVE | Noted: 2024-05-29

## 2024-05-29 PROBLEM — M50.90 DISORDER OF INTERVERTEBRAL DISC OF CERVICAL SPINE: Status: ACTIVE | Noted: 2024-05-29

## 2024-05-29 PROBLEM — R05.9 COUGH, UNSPECIFIED: Status: RESOLVED | Noted: 2022-12-12 | Resolved: 2024-05-29

## 2024-05-29 PROBLEM — H43.819 VITREOUS DEGENERATION: Status: ACTIVE | Noted: 2024-05-29

## 2024-05-29 PROBLEM — L97.522 NON-PRESSURE CHRONIC ULCER OF OTHER PART OF LEFT FOOT WITH FAT LAYER EXPOSED (MULTI): Status: RESOLVED | Noted: 2023-11-13 | Resolved: 2024-05-29

## 2024-05-29 PROCEDURE — 1157F ADVNC CARE PLAN IN RCRD: CPT | Performed by: INTERNAL MEDICINE

## 2024-05-29 PROCEDURE — 1159F MED LIST DOCD IN RCRD: CPT | Performed by: INTERNAL MEDICINE

## 2024-05-29 PROCEDURE — 99215 OFFICE O/P EST HI 40 MIN: CPT | Performed by: INTERNAL MEDICINE

## 2024-05-29 PROCEDURE — 3077F SYST BP >= 140 MM HG: CPT | Performed by: INTERNAL MEDICINE

## 2024-05-29 PROCEDURE — 3079F DIAST BP 80-89 MM HG: CPT | Performed by: INTERNAL MEDICINE

## 2024-05-29 PROCEDURE — 1160F RVW MEDS BY RX/DR IN RCRD: CPT | Performed by: INTERNAL MEDICINE

## 2024-05-29 PROCEDURE — 1036F TOBACCO NON-USER: CPT | Performed by: INTERNAL MEDICINE

## 2024-05-29 RX ORDER — HYDROCORTISONE 25 MG/G
CREAM TOPICAL 2 TIMES DAILY PRN
Qty: 30 G | Refills: 2 | Status: SHIPPED | OUTPATIENT
Start: 2024-05-29 | End: 2025-05-29

## 2024-05-29 SDOH — ECONOMIC STABILITY: FOOD INSECURITY: WITHIN THE PAST 12 MONTHS, THE FOOD YOU BOUGHT JUST DIDN'T LAST AND YOU DIDN'T HAVE MONEY TO GET MORE.: NEVER TRUE

## 2024-05-29 SDOH — ECONOMIC STABILITY: FOOD INSECURITY: WITHIN THE PAST 12 MONTHS, YOU WORRIED THAT YOUR FOOD WOULD RUN OUT BEFORE YOU GOT MONEY TO BUY MORE.: NEVER TRUE

## 2024-05-29 ASSESSMENT — LIFESTYLE VARIABLES
SKIP TO QUESTIONS 9-10: 1
HOW OFTEN DO YOU HAVE SIX OR MORE DRINKS ON ONE OCCASION: NEVER
AUDIT-C TOTAL SCORE: 1
HOW MANY STANDARD DRINKS CONTAINING ALCOHOL DO YOU HAVE ON A TYPICAL DAY: 1 OR 2
HOW OFTEN DO YOU HAVE A DRINK CONTAINING ALCOHOL: MONTHLY OR LESS

## 2024-05-29 NOTE — ASSESSMENT & PLAN NOTE
Patient has localized swelling of the right calf area. Patient has some evidence of venous insufficiency, the calf appears to be enlarged. Will refer to physical medicine for an assessment of the musculature. MRI may be an option to evaluate this

## 2024-05-29 NOTE — ASSESSMENT & PLAN NOTE
Slightly elevated today, BP is good at home per the patient.  Recheck BP in 3-4 weeks prior to changing med therapy

## 2024-05-29 NOTE — PROGRESS NOTES
Chief Complaint/HPI:    Right calf swelling: patient has noted swelling of the right calf, it has been swelled for 4-5 months.  US notes venous reflux in common femoral veins    Bites: patient developed multiple bites on the arms last weekend. Patient apparently was bitten by insects. Patient initially had significant swelling of the left arm, swelling has decreased, it still itches.     Hernia: patient moved bags of dirt at home, she developed swelling of the abdomen, patient is concerned that she has another hernia     asthma, patient has seen pulmonary medicine, she had PFTs completed,  she states that treatment with Wixela   was  very helpful. Allergy testing was completed, she is allergic to cats, patient has not used Wixela recently, she has felt well      foot ulcer: patient had a large callus that was trimmed by Dr Patel, patient is monitored by podiatry every 10 weeks.      hypomagnesemia: takes magnesium once daily     iron deficiency/ colon cancer: patient had right hemicolectomy in 8/2020 after colonoscopy noted colon cancer. Patient has not had any additional issues, Dr Pereira has retired. Patient is doing well now. Had follow up colonoscopy, she is to have a follow up colonoscopy in 3 years per Dr Ramirez. Patient has added a multivitamin to regimen     mammogram screening: patient was advised to get a follow up mammogram after 6/29/2023, most recent mammogram was done at [facility], it has been completed     Hypothyroidism: patient still takes levothyroxine, she takes 112 mcg dose     Hyperlipidemia: takes atorvastatin     ROS otherwise negative aside from what was mentioned above in HPI.      Patient Active Problem List   Diagnosis    Abnormal stress test    ACE inhibitor-aggravated angioedema, initial encounter    Acute exacerbation of moderate persistent extrinsic asthma ([facility]HS-HCC)    Acute maxillary sinusitis    Anemia    Exacerbation of asthma (HHS-HCC)    Atypical chest pain    Benign essential  hypertension    Carpal tunnel syndrome, right    Chronic sinusitis    Closed anterior dislocation of humerus    Colon cancer (Multi)    Depression    Diaphragmatic hernia    Elevated temperature    GERD (gastroesophageal reflux disease)    Hip pain, right    Hyperlipidemia    Hypokalemia    Hypomagnesemia    Hypothyroidism    Insomnia    Iron deficiency    Iron deficiency anemia    Left temporal headache    Lipoma of back    Localized cyst of bone    Chronic pain    Displacement of lumbar intervertebral disc without myelopathy    Myalgia    Trochanteric bursitis of right hip    Pain in right hip    Diabetes mellitus type 2 in obese    H/O degenerative disc disease    Chest pain    Malignant neoplasm of ascending colon (Multi)    Diabetes mellitus (Multi)    Type 2 diabetes mellitus with carpal tunnel syndrome (Multi)    Asthma (HHS-HCC)    Hammer toe    Low back pain    Major depressive disorder, single episode    Moderate persistent asthma ([facility])    Muscle tension headache    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)    Obstructive sleep apnea syndrome in adult    Osteopenia of multiple sites    Osteopenia    Allergic rhinitis    Synovial cyst of left popliteal space    Primary osteoarthritis of left knee    Posterior right knee pain    Polyp of colon    Pain in soft tissues of limb    Pain in joint, shoulder region    URI, acute    Systolic ejection murmur    Renal cysts, acquired, bilateral    Right calf pain    Sciatica    Swelling of calf    Vitamin D deficiency    Vitamin B12 deficiency    Delayed emergence from anesthesia    Adenomatous polyp of sigmoid colon    Acquired absence of other right toe(s) (Multi)    Major depressive disorder in full remission, unspecified whether recurrent (CMS-HCC)    Localized swelling of right lower leg    Routine general medical examination at health care facility    Disorder of intervertebral disc of cervical spine    Primary localized osteoarthritis of pelvic region and thigh     Vitreous degeneration    Ventral hernia without obstruction or gangrene    Multiple insect bites         Past Medical History:   Diagnosis Date    Anemia     Asthma (HHS-HCC)     Cough, unspecified 2022    Delayed emergence from general anesthesia     Depression     Diabetes mellitus (Multi)     Diarrhea     GERD (gastroesophageal reflux disease)     Hyperlipidemia     Hypertension     Personal history of other benign neoplasm     History of benign neoplasm of colon    Personal history of other endocrine, nutritional and metabolic disease     History of hyperlipidemia    Retinal detachment with single break, unspecified eye     Partial recent retinal detachment with single defect    Sleep apnea     Vitreous degeneration, unspecified eye     Vitreous degeneration     Past Surgical History:   Procedure Laterality Date    COLONOSCOPY  10/10/2023    OTHER SURGICAL HISTORY  2020    Hemicolectomy    OTHER SURGICAL HISTORY  2020    Colon surgery    OTHER SURGICAL HISTORY  2020    Hip replacement    OTHER SURGICAL HISTORY  2020     section    OTHER SURGICAL HISTORY  2020    Cholecystectomy    OTHER SURGICAL HISTORY  2020    Amputation    OTHER SURGICAL HISTORY  2020    Colonoscopy    OTHER SURGICAL HISTORY  2020    Toe tenotomy    VENTRAL HERNIA REPAIR  10/11/2023     Social History     Social History Narrative    Not on file         ALLERGIES  Bacitracin, Lisinopril, and Codeine      MEDICATIONS  Current Outpatient Medications on File Prior to Visit   Medication Sig Dispense Refill    acetaminophen (Tylenol) 500 mg tablet Take by mouth every 6 hours if needed.      albuterol 90 mcg/actuation inhaler Inhale every 6 hours.      amLODIPine (Norvasc) 10 mg tablet Take 1 tablet (10 mg) by mouth once daily.      atorvastatin (Lipitor) 40 mg tablet Take 1 tablet (40 mg) by mouth once daily at bedtime. 90 tablet 1    buPROPion SR (Wellbutrin SR) 150 mg 12 hr tablet Take  "1 tablet (150 mg) by mouth every 12 hours. 180 tablet 1    cholecalciferol (Vitamin D-3) 50 mcg (2,000 unit) capsule Take by mouth.      cinnamon 500 mg capsule Take 2 capsules (1,000 mg) by mouth twice a day.      cyanocobalamin (Vitamin B-12) 1,000 mcg tablet Take 100 mcg by mouth 3 times a week.      ferrous sulfate 325 (65 Fe) MG tablet Take 1 tablet by mouth once daily.      fexofenadine (Allegra) 180 mg tablet Take 1 tablet (180 mg) by mouth once daily.      gabapentin (Neurontin) 300 mg capsule Take 1 capsule (300 mg) by mouth once daily at bedtime. 90 capsule 1    Klor-Con M10 10 mEq ER tablet Take 1 tablet (10 mEq) by mouth once daily. 90 tablet 1    levothyroxine (Synthroid, Levoxyl) 112 mcg tablet Take 1 tablet (112 mcg) by mouth once daily. 90 tablet 1    magnesium oxide (Mag-Ox) 400 mg (241.3 mg magnesium) tablet TAKE 1 TABLET BY MOUTH DAILY 90 tablet 1    metFORMIN (Glucophage) 850 mg tablet Take 1 tablet (850 mg) by mouth 2 times a day with meals. 180 tablet 1    montelukast (Singulair) 10 mg tablet Take 1 tablet (10 mg) by mouth once daily. 90 tablet 1    omeprazole (PriLOSEC) 20 mg DR capsule Take by mouth once daily.      vit A,C and E-lutein-minerals (Ocuvite with Lutein) 300 mcg-200 mg-27 mg-2 mg tablet Take 1 tablet by mouth once daily.      Wixela Inhub 250-50 mcg/dose diskus inhaler Inhale 1 puff once daily.       No current facility-administered medications on file prior to visit.         PHYSICAL EXAM  /83 (BP Location: Left arm, Patient Position: Sitting, BP Cuff Size: Adult)   Pulse 80   Temp 36.4 °C (97.6 °F)   Resp 16   Ht 1.6 m (5' 3\")   Wt 73.8 kg (162 lb 9.6 oz)   SpO2 96%   BMI 28.80 kg/m²   Body mass index is 28.8 kg/m².  Constitutional   General appearance: Alert and in no acute distress. well developed, well nourished, overweight and pleasant    Eyes   Inspection of eyes: Sclera and conjunctiva were normal.   Ears, Nose, Mouth, and Throat   Ears: Auricles: Normal. No " thyromegaly or neck masses are noted   Pulmonary   Respiratory assessment: No respiratory distress, normal respiratory rhythm and effort.    Auscultation of lungs: Auscultation of the lungs revealed no wheezing, no rales or crackles were heard bilaterally. no rhonchi. no friction rub. No diminished breath sounds. no bronchial breath sounds.   Cardiovascular   Auscultation of heart: Abnormal.  The heart rate was normal. The rhythm was regular. Heart sounds: normal S1 and normal S2. A grade 2/6 systolic murmur was heard at the LUSB. A grade 1/6 systolic murmur was heard at the RLSB. Unchanged. No carotid bruits are noted, right calf/ leg is larger than the left leg, trace pitting edema is noted R>L. Varicosities are noted also   Abdomen  A midline healed scar is noted, localized bulging of the left periumbilical abdominal region is noted, the area is reducible, no tenderness is noted. Prominence is noted when the patient stands  Lymphatic   Palpation of lymph nodes in neck: No cervical lymphadenopathy.   Neurologic   Cranial nerves: Nerves 2-12 were intact, no focal neuro defects. wearing a mask.   Psychiatric   Orientation: Oriented to person, place, and time.    Mood and affect: Normal.   MSK  The right calf is firm to palpation, it is larger than the left calf  Skin  Localized macules noted on the LUE >RUE, these appear to be multiple insect bites. Slight erythema is noted, localized swelling of the macules is noted      ASSESSMENT/PLAN  Problem List Items Addressed This Visit       Benign essential hypertension    Current Assessment & Plan     Slightly elevated today, BP is good at home per the patient.  Recheck BP in 3-4 weeks prior to changing med therapy         Relevant Orders    Comprehensive Metabolic Panel    CBC and Auto Differential    Colon cancer (Multi)    Relevant Orders    Comprehensive Metabolic Panel    CBC and Auto Differential    Hypothyroidism    Current Assessment & Plan     Stable at present,  no changes recheck labs in 6 months         Relevant Orders    Comprehensive Metabolic Panel    CBC and Auto Differential    TSH with reflex to Free T4 if abnormal    Diabetes mellitus (Multi)    Current Assessment & Plan     Glucoses appear to be well controlled, no med changes needed, recheck labs in 6 months         Relevant Orders    Hemoglobin A1C    Comprehensive Metabolic Panel    CBC and Auto Differential    Albumin , Urine Random    Lipid Panel    Vitamin D deficiency    Current Assessment & Plan     Stable, continue to monitor         Relevant Orders    Comprehensive Metabolic Panel    CBC and Auto Differential    Vitamin D 25-Hydroxy,Total (for eval of Vitamin D levels)    Acquired absence of other right toe(s) (Multi)    Relevant Orders    Comprehensive Metabolic Panel    CBC and Auto Differential    Major depressive disorder in full remission, unspecified whether recurrent (CMS-HCC)    Relevant Orders    Comprehensive Metabolic Panel    CBC and Auto Differential    Localized swelling of right lower leg    Current Assessment & Plan     Patient has localized swelling of the right calf area. Patient has some evidence of venous insufficiency, the calf appears to be enlarged. Will refer to physical medicine for an assessment of the musculature. MRI may be an option to evaluate this          Relevant Orders    Comprehensive Metabolic Panel    CBC and Auto Differential    Referral to Physical Medicine Rehab    Ventral hernia without obstruction or gangrene - Primary    Current Assessment & Plan     Hernia noted, will refer to Dr Ramirez (general surgery ) for an assessment          Relevant Orders    Referral to General Surgery    Comprehensive Metabolic Panel    CBC and Auto Differential    Multiple insect bites    Current Assessment & Plan     Trial of hydrocortisone cream as needed         Relevant Medications    hydrocortisone 2.5 % cream    Other Relevant Orders    Comprehensive Metabolic Panel    CBC  and Auto Differential     Other Visit Diagnoses       Type 2 diabetes mellitus with obesity (Multi)        Relevant Orders    Comprehensive Metabolic Panel    CBC and Auto Differential          Check labs as ordered, will refer to general surgery, refer to physical med for evaluation of the calf to see if this is an issue    Recheck vitals in 4 weeks, follow up in 6 months  Melo Benjamin MD

## 2024-06-21 DIAGNOSIS — E03.8 OTHER SPECIFIED HYPOTHYROIDISM: ICD-10-CM

## 2024-06-21 DIAGNOSIS — I10 BENIGN ESSENTIAL HYPERTENSION: Primary | ICD-10-CM

## 2024-06-21 DIAGNOSIS — E03.9 ACQUIRED HYPOTHYROIDISM: Primary | ICD-10-CM

## 2024-06-21 RX ORDER — LEVOTHYROXINE SODIUM 112 UG/1
112 TABLET ORAL DAILY
Qty: 30 TABLET | Refills: 0 | Status: SHIPPED | OUTPATIENT
Start: 2024-06-21 | End: 2025-06-21

## 2024-06-24 RX ORDER — AMLODIPINE BESYLATE 10 MG/1
10 TABLET ORAL DAILY
Qty: 90 TABLET | Refills: 2 | Status: SHIPPED | OUTPATIENT
Start: 2024-06-24 | End: 2025-06-24

## 2024-06-25 ENCOUNTER — APPOINTMENT (OUTPATIENT)
Dept: PRIMARY CARE | Facility: CLINIC | Age: 79
End: 2024-06-25
Payer: MEDICARE

## 2024-06-25 VITALS
DIASTOLIC BLOOD PRESSURE: 66 MMHG | BODY MASS INDEX: 29.27 KG/M2 | TEMPERATURE: 96.6 F | HEART RATE: 95 BPM | WEIGHT: 165.2 LBS | RESPIRATION RATE: 20 BRPM | OXYGEN SATURATION: 94 % | SYSTOLIC BLOOD PRESSURE: 136 MMHG | HEIGHT: 63 IN

## 2024-06-25 DIAGNOSIS — I10 BENIGN ESSENTIAL HYPERTENSION: Primary | ICD-10-CM

## 2024-06-25 DIAGNOSIS — I10 HYPERTENSION, UNSPECIFIED TYPE: ICD-10-CM

## 2024-06-25 PROCEDURE — 99211 OFF/OP EST MAY X REQ PHY/QHP: CPT | Performed by: INTERNAL MEDICINE

## 2024-06-25 RX ORDER — AMLODIPINE BESYLATE 10 MG/1
10 TABLET ORAL DAILY
Qty: 90 TABLET | Refills: 3 | Status: SHIPPED | OUTPATIENT
Start: 2024-06-25 | End: 2025-06-25

## 2024-06-25 ASSESSMENT — PAIN SCALES - GENERAL: PAINLEVEL: 0-NO PAIN

## 2024-06-25 NOTE — PROGRESS NOTES
Pt was brought back to room 1 and vitals were taken.  132/64 left arm 144/68 right arm.  Temp 96.6, pulse 95, resp 20, Ox 94, weight 165.2, and height 5'3.  Pt sat for a few minutes and blood pressure was retaken.  Left arm 128/62 and right arm 136/66.  Pt had no questions at this time and check out with the .

## 2024-07-02 ENCOUNTER — OFFICE VISIT (OUTPATIENT)
Dept: PULMONOLOGY | Facility: HOSPITAL | Age: 79
End: 2024-07-02
Payer: MEDICARE

## 2024-07-02 VITALS
WEIGHT: 165 LBS | OXYGEN SATURATION: 99 % | HEIGHT: 63 IN | TEMPERATURE: 98.5 F | RESPIRATION RATE: 16 BRPM | BODY MASS INDEX: 29.23 KG/M2 | DIASTOLIC BLOOD PRESSURE: 86 MMHG | SYSTOLIC BLOOD PRESSURE: 132 MMHG | HEART RATE: 83 BPM

## 2024-07-02 DIAGNOSIS — J30.2 PERENNIAL ALLERGIC RHINITIS WITH SEASONAL VARIATION: ICD-10-CM

## 2024-07-02 DIAGNOSIS — J30.89 PERENNIAL ALLERGIC RHINITIS WITH SEASONAL VARIATION: ICD-10-CM

## 2024-07-02 DIAGNOSIS — J45.41 ACUTE EXACERBATION OF MODERATE PERSISTENT EXTRINSIC ASTHMA (HHS-HCC): Primary | ICD-10-CM

## 2024-07-02 PROCEDURE — 99214 OFFICE O/P EST MOD 30 MIN: CPT | Performed by: INTERNAL MEDICINE

## 2024-07-02 PROCEDURE — 3075F SYST BP GE 130 - 139MM HG: CPT | Performed by: INTERNAL MEDICINE

## 2024-07-02 PROCEDURE — 3079F DIAST BP 80-89 MM HG: CPT | Performed by: INTERNAL MEDICINE

## 2024-07-02 PROCEDURE — 1036F TOBACCO NON-USER: CPT | Performed by: INTERNAL MEDICINE

## 2024-07-02 PROCEDURE — 1159F MED LIST DOCD IN RCRD: CPT | Performed by: INTERNAL MEDICINE

## 2024-07-02 PROCEDURE — 1157F ADVNC CARE PLAN IN RCRD: CPT | Performed by: INTERNAL MEDICINE

## 2024-07-02 RX ORDER — AZELASTINE 1 MG/ML
1 SPRAY, METERED NASAL 2 TIMES DAILY
Qty: 30 ML | Refills: 0 | Status: SHIPPED | OUTPATIENT
Start: 2024-07-02

## 2024-07-02 RX ORDER — AZITHROMYCIN 500 MG/1
500 TABLET, FILM COATED ORAL DAILY
Qty: 5 TABLET | Refills: 0 | Status: SHIPPED | OUTPATIENT
Start: 2024-07-02 | End: 2024-07-07

## 2024-07-02 RX ORDER — FLUTICASONE PROPIONATE AND SALMETEROL 250; 50 UG/1; UG/1
1 POWDER RESPIRATORY (INHALATION) DAILY
Qty: 60 EACH | Refills: 1 | Status: SHIPPED | OUTPATIENT
Start: 2024-07-02

## 2024-07-02 RX ORDER — FLUTICASONE PROPIONATE 50 MCG
2 SPRAY, SUSPENSION (ML) NASAL DAILY
Qty: 16 G | Refills: 11 | Status: SHIPPED | OUTPATIENT
Start: 2024-07-02 | End: 2024-12-29

## 2024-07-02 RX ORDER — PREDNISONE 10 MG/1
TABLET ORAL DAILY
Qty: 30 TABLET | Refills: 0 | Status: SHIPPED | OUTPATIENT
Start: 2024-07-02 | End: 2024-07-14

## 2024-07-02 ASSESSMENT — ENCOUNTER SYMPTOMS
RHINORRHEA: 0
UNEXPECTED WEIGHT CHANGE: 0
COUGH: 1
FEVER: 0
CHILLS: 0
SHORTNESS OF BREATH: 0
WHEEZING: 0
FATIGUE: 0

## 2024-07-02 ASSESSMENT — PATIENT HEALTH QUESTIONNAIRE - PHQ9
SUM OF ALL RESPONSES TO PHQ9 QUESTIONS 1 AND 2: 0
1. LITTLE INTEREST OR PLEASURE IN DOING THINGS: NOT AT ALL
2. FEELING DOWN, DEPRESSED OR HOPELESS: NOT AT ALL

## 2024-07-02 NOTE — PROGRESS NOTES
Subjective   Patient ID: Tonja Castillo is a 79 y.o. female who presents for follow up for asthma and sinus symptoms.     Asthma  She complains of cough. There is no shortness of breath or wheezing. Pertinent negatives include no chest pain, fever, postnasal drip or rhinorrhea. Her past medical history is significant for asthma.   Cough  Pertinent negatives include no chest pain, chills, fever, postnasal drip, rhinorrhea, shortness of breath or wheezing. Her past medical history is significant for asthma.   : Patient reports having hx of childhood asthma was well controlled with prn albuterol. She says she had an asthma exacerbation in 2016 but was mild and recovered. However, she had an exacerbation in Dec 2022 and was taking nebulizer and prn albuterol but did not get better. She saw Dr. Benjamin on Jan 24, 23 and was started on wixela which has resolved her symptoms completely. She did not need prednisone but did get atbx in Dec 2022. She is currently asymptomatic.      Today she is here for follow up. She states she noted worsening sinus congestion and postnasal drip. She started taking OTC tylenol PM. However in the last few days she is coughing and wheezing more along with sore throat, clearing her throat and notes SOB after the coughing spell. Coughing is mostly dry. She takes montelukast HS and allegra daily in the morning but does not take flonase. She did not take wixela for over a year but now in the last week she has restarted it. She does not take albuterol MDI.     Review of Systems   Constitutional:  Negative for chills, fatigue, fever and unexpected weight change.   HENT:  Positive for congestion. Negative for postnasal drip and rhinorrhea.    Respiratory:  Positive for cough. Negative for shortness of breath and wheezing.    Cardiovascular:  Negative for chest pain and leg swelling.   All other systems reviewed and are negative.      Objective   Physical Exam  Vitals reviewed.   Constitutional:        Appearance: Normal appearance.   HENT:      Head: Normocephalic.   Cardiovascular:      Rate and Rhythm: Normal rate and regular rhythm.   Pulmonary:      Effort: Pulmonary effort is normal.      Breath sounds: Examination of the right-upper field reveals wheezing. Examination of the left-upper field reveals wheezing. Wheezing present.   Skin:     General: Skin is warm and dry.   Neurological:      Mental Status: She is alert.         Assessment/Plan   1. Moderate persistent asthma  2. Allergic rhinitis     Plan:     Patient has long standing history of bronchial asthma since childhood. She has had a mild exacerbation around 2016 and again had moderate exacerbation with prolonged residual symptoms of hyperreactivity in Dec 2022. She is getting again exacerbations off and on, today July 2, 24 in with worsening symptoms c/w mild exacerbation.     -PFTs were normal.   -She was able to stop Wixela altogether last year without worsening of her breathing. She has not needed albuterol. She is still getting exacerbations 1-2 times a year. Advise Wixela now for a month and take albuterol prn with low threshold.     -Check IgE 47, RAST + for dogs and cats. Eos 330. She takes OTC allegra daily with Montelukast HS. Symptoms are usually controlled but with worsening in Spring and Summer. Recommend take fluticasone nasal spray in Spring and Summer every year and prn rest of the time. I will give her azelastine prn for a month also.      Discussed again with goal of controlling exacerbations. Although with symptoms in between the exacerbations are controlled, I recommend keeping ICS/LABA due to recurrent exacerbations along with montelukast HS. Also advised to not forget flonase during spring and summer while she is regular with allegra and montelukast. Today, with exacerbation I have ordered prednisone taper and also gave azithromycin to start if cough worsens and becomes productive. CXR prn if not improving. Take albuterol  with low threshold.     Follow up yearly.

## 2024-07-02 NOTE — PATIENT INSTRUCTIONS
Continue albuterol as needed for shortness of breath and coughing spells as discussed.  Call with any questions or concerns.   Please take Wixela 1 puff twice a day this month.  Take prednisone taper as discussed for asthma flare up.  Take nasal sprays fluticasone 1 spray daily regularly through spring and summer every year and other times may take it as needed. Continue montelukast nightly.   Take azelastine nasal spray 1 spray in each nostril twice daily as needed now for nasal congestion, cough and sinus drainage.   Take antibiotic as discussed and Chest xray if symptoms are not better.   Follow up with Lise REAL in May 2025.

## 2024-07-03 ENCOUNTER — APPOINTMENT (OUTPATIENT)
Dept: PRIMARY CARE | Facility: CLINIC | Age: 79
End: 2024-07-03
Payer: MEDICARE

## 2024-07-09 ENCOUNTER — HOSPITAL ENCOUNTER (OUTPATIENT)
Dept: RADIOLOGY | Facility: CLINIC | Age: 79
Discharge: HOME | End: 2024-07-09
Payer: MEDICARE

## 2024-07-09 VITALS — BODY MASS INDEX: 29.23 KG/M2 | WEIGHT: 165 LBS | HEIGHT: 63 IN

## 2024-07-09 DIAGNOSIS — J45.901 EXACERBATION OF ASTHMA, UNSPECIFIED ASTHMA SEVERITY, UNSPECIFIED WHETHER PERSISTENT (HHS-HCC): ICD-10-CM

## 2024-07-09 DIAGNOSIS — Z12.31 ENCOUNTER FOR SCREENING MAMMOGRAM FOR MALIGNANT NEOPLASM OF BREAST: ICD-10-CM

## 2024-07-09 PROCEDURE — 77067 SCR MAMMO BI INCL CAD: CPT | Performed by: RADIOLOGY

## 2024-07-09 PROCEDURE — 77067 SCR MAMMO BI INCL CAD: CPT

## 2024-07-09 PROCEDURE — 77063 BREAST TOMOSYNTHESIS BI: CPT | Performed by: RADIOLOGY

## 2024-07-09 RX ORDER — MONTELUKAST SODIUM 10 MG/1
10 TABLET ORAL DAILY
Qty: 90 TABLET | Refills: 1 | Status: SHIPPED | OUTPATIENT
Start: 2024-07-09

## 2024-07-10 DIAGNOSIS — E87.6 HYPOKALEMIA: ICD-10-CM

## 2024-07-10 RX ORDER — POTASSIUM CHLORIDE 750 MG/1
10 TABLET, EXTENDED RELEASE ORAL DAILY
Qty: 90 TABLET | Refills: 1 | Status: SHIPPED | OUTPATIENT
Start: 2024-07-10

## 2024-07-11 ENCOUNTER — APPOINTMENT (OUTPATIENT)
Dept: PHYSICAL MEDICINE AND REHAB | Facility: CLINIC | Age: 79
End: 2024-07-11
Payer: MEDICARE

## 2024-07-11 VITALS — SYSTOLIC BLOOD PRESSURE: 179 MMHG | RESPIRATION RATE: 20 BRPM | DIASTOLIC BLOOD PRESSURE: 95 MMHG | HEART RATE: 99 BPM

## 2024-07-11 DIAGNOSIS — S86.111A GASTROCNEMIUS TEAR, RIGHT, INITIAL ENCOUNTER: Primary | ICD-10-CM

## 2024-07-11 DIAGNOSIS — R22.41 LOCALIZED SWELLING OF RIGHT LOWER LEG: ICD-10-CM

## 2024-07-11 PROCEDURE — 99203 OFFICE O/P NEW LOW 30 MIN: CPT | Performed by: PHYSICAL MEDICINE & REHABILITATION

## 2024-07-11 ASSESSMENT — PAIN SCALES - GENERAL: PAINLEVEL: 0-NO PAIN

## 2024-07-11 NOTE — PROGRESS NOTES
Ptpain to area.  ref by PCP for right leg calf swelling.  No pain to the area.  Fell 2 yearsa ago.  Area healed and swelling started again about 6-8 months ago.

## 2024-07-11 NOTE — PROGRESS NOTES
Physical Medicine and Rehabilitation MSK Consult  07/11/24       Chief Complaint:  Low back pain     HPI:  Tonja Castillo is a  79 y.o. F who presents to the clinic today  for evaluation of low back pain.  Had a fall in Nov 2022 in bathroom onto the calf on the R.  Didn't hear a pop.She had bruising that got better. Then she had swelling again fall/winter of 2023.  Does not get any significant general LE swelling. Wears compression socks in the winter.  No pain,weakness, not falling or tripping,.       Treatment to date:  Physical therapy: No  Medications taken to date for this complaint include the following:   none  Prior injections: No        Imaging  Reviewed 07/11/24     Dopplers LE 12/2023  CONCLUSIONS:  Right Lower Venous Insufficiency: There is reflux noted in the common femoral vein. No other deep or superficial reflux is noted.  The SSV arises proximal to the popliteal(G[place]omini). No reflux is seen in the Vein of Giacomini.  Left Lower Venous Insufficiency: Reflux is noted in the saphenofemoral junction vein. There is reflux noted in the common femoral vein. No other deep or superficial reflux is noted.  The SSV is small(unable to evaluated for reflux).  Right Lower Venous: No evidence of acute deep vein thrombus visualized in the right lower extremity.  Left Lower Venous: No evidence of acute deep vein thrombus visualized in the left lower extremity.       Nov 2022  FINDINGS:  THIGH VEINS: There is normal compressibility of the right common  femoral vein, saphenous-femoral junction, femoral vein and popliteal  vein. There is normal spontaneous and phasic variation by spectral  doppler.     CALF VEINS: The posterior tibial and peroneal veins demonstrate  normal compressibility.     CONTRALATERAL COMPARISON: The left common femoral vein is patent.     OTHER FINDINGS: None.     IMPRESSION:  No evidence of acute DVT in the right lower extremity.  Past Medical History:   Diagnosis Date    Anemia     Asthma  ([facility])     Colon cancer (Multi)     Cough, unspecified 2022    Delayed emergence from general anesthesia     Depression     Diabetes mellitus (Multi)     Diarrhea     GERD (gastroesophageal reflux disease)     Hyperlipidemia     Hypertension     Personal history of other benign neoplasm     History of benign neoplasm of colon    Personal history of other endocrine, nutritional and metabolic disease     History of hyperlipidemia    Retinal detachment with single break, unspecified eye     Partial recent retinal detachment with single defect    Sleep apnea     Vitreous degeneration, unspecified eye     Vitreous degeneration        Past Surgical History:   Procedure Laterality Date    COLONOSCOPY  10/10/2023    OTHER SURGICAL HISTORY  2020    Hemicolectomy    OTHER SURGICAL HISTORY  2020    Colon surgery    OTHER SURGICAL HISTORY  2020    Hip replacement    OTHER SURGICAL HISTORY  2020     section    OTHER SURGICAL HISTORY  2020    Cholecystectomy    OTHER SURGICAL HISTORY  2020    Amputation    OTHER SURGICAL HISTORY  2020    Colonoscopy    OTHER SURGICAL HISTORY  2020    Toe tenotomy    VENTRAL HERNIA REPAIR  10/11/2023        Patient Active Problem List    Diagnosis Date Noted    Disorder of intervertebral disc of cervical spine 2024    Primary localized osteoarthritis of pelvic region and thigh 2024    Vitreous degeneration 2024    Ventral hernia without obstruction or gangrene 2024    Multiple insect bites 2024    Acquired absence of other right toe(s) (Multi) 2023    Major depressive disorder in full remission, unspecified whether recurrent ([facility]) 2023    Localized swelling of right lower leg 2023    Routine general medical examination at health care facility 2023    Delayed emergence from anesthesia 10/10/2023    Adenomatous polyp of sigmoid colon 10/10/2023    H/O degenerative disc disease  09/22/2023    Chest pain 09/22/2023    Malignant neoplasm of ascending colon (Multi) 09/22/2023    Diabetes mellitus (Multi) 09/22/2023    Type 2 diabetes mellitus with carpal tunnel syndrome (Multi) 09/22/2023    Asthma (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) 09/22/2023    Obstructive sleep apnea syndrome in adult 09/22/2023    Osteopenia of multiple sites 09/22/2023    Osteopenia 09/22/2023    Allergic rhinitis 09/22/2023    Synovial cyst of left popliteal space 09/22/2023    Primary osteoarthritis of left knee 09/22/2023    Posterior right knee pain 09/22/2023    Polyp of colon 09/22/2023    Pain in soft tissues of limb 09/22/2023    Pain in joint, shoulder region 09/22/2023    URI, acute 09/22/2023    Systolic ejection murmur 09/22/2023    Renal cysts, acquired, bilateral 09/22/2023    Right calf pain 09/22/2023    Sciatica 09/22/2023    Swelling of calf 09/22/2023    Vitamin D deficiency 09/22/2023    Vitamin B12 deficiency 09/22/2023    Diabetes mellitus type 2 in obese 05/11/2023    Abnormal stress test 04/28/2023    ACE inhibitor-aggravated angioedema, initial encounter 04/28/2023    Acute exacerbation of moderate persistent extrinsic asthma ([facility]) 04/28/2023    Acute maxillary sinusitis 04/28/2023    Anemia 04/28/2023    Exacerbation of asthma ([facility]) 04/28/2023    Atypical chest pain 04/28/2023    Benign essential hypertension 04/28/2023    Carpal tunnel syndrome, right 04/28/2023    Chronic sinusitis 04/28/2023    Closed anterior dislocation of humerus 04/28/2023    Colon cancer (Multi) 04/28/2023    Depression 04/28/2023    Diaphragmatic hernia 04/28/2023    Elevated temperature 04/28/2023    GERD (gastroesophageal reflux disease) 04/28/2023    Hip pain, right 04/28/2023    Hyperlipidemia  04/28/2023    Hypokalemia 04/28/2023    Hypomagnesemia 04/28/2023    Hypothyroidism 04/28/2023    Insomnia 04/28/2023    Iron deficiency 04/28/2023    Iron deficiency anemia 04/28/2023    Left temporal headache 04/28/2023    Lipoma of back 04/28/2023    Localized cyst of bone 04/28/2023    Pain in right hip 07/08/2016    Myalgia 06/22/2016    Chronic pain 05/18/2016    Displacement of lumbar intervertebral disc without myelopathy 05/18/2016    Trochanteric bursitis of right hip 05/18/2016   Asthma  [place]  Hypothyroidism  HTN  Venous insufficiency     Family History   Problem Relation Name Age of Onset    Hypertension Mother      Coronary artery disease Mother      Diabetes Father      Coronary artery disease Father      Hypertension Father          Current Outpatient Medications   Medication Sig Dispense Refill    acetaminophen (Tylenol) 500 mg tablet Take by mouth every 6 hours if needed.      albuterol 90 mcg/actuation inhaler Inhale every 6 hours.      amLODIPine (Norvasc) 10 mg tablet Take 1 tablet (10 mg) by mouth once daily. 90 tablet 2    amLODIPine (Norvasc) 10 mg tablet Take 1 tablet (10 mg) by mouth once daily. 90 tablet 3    atorvastatin (Lipitor) 40 mg tablet Take 1 tablet (40 mg) by mouth once daily at bedtime. 90 tablet 1    azelastine (Astelin) 137 mcg (0.1 %) nasal spray Administer 1 spray into each nostril 2 times a day. Use in each nostril as directed 30 mL 0    buPROPion SR (Wellbutrin SR) 150 mg 12 hr tablet Take 1 tablet (150 mg) by mouth every 12 hours. 180 tablet 1    cholecalciferol (Vitamin D-3) 50 mcg (2,000 unit) capsule Take by mouth.      cinnamon 500 mg capsule Take 2 capsules (1,000 mg) by mouth twice a day.      cyanocobalamin (Vitamin B-12) 1,000 mcg tablet Take 100 mcg by mouth 3 times a week.      ferrous sulfate 325 (65 Fe) MG tablet Take 1 tablet by mouth once daily.      fexofenadine (Allegra) 180 mg tablet Take 1 tablet (180 mg) by mouth once daily.      fluticasone  (Flonase) 50 mcg/actuation nasal spray Administer 2 sprays into each nostril once daily. Shake gently. Before first use, prime pump. After use, clean tip and replace cap. 16 g 11    gabapentin (Neurontin) 300 mg capsule Take 1 capsule (300 mg) by mouth once daily at bedtime. 90 capsule 1    hydrocortisone 2.5 % cream Apply topically 2 times a day as needed for irritation or rash. 30 g 2    Klor-Con M10 10 mEq ER tablet TAKE 1 TABLET (10 MEQ) BY MOUTH ONCE DAILY 90 tablet 1    levothyroxine (Synthroid, Levoxyl) 112 mcg tablet Take 1 tablet (112 mcg) by mouth early in the morning.. Take on an empty stomach at the same time each day, either 30 to 60 minutes prior to breakfast 30 tablet 0    magnesium oxide (Mag-Ox) 400 mg (241.3 mg magnesium) tablet TAKE 1 TABLET BY MOUTH DAILY 90 tablet 1    metFORMIN (Glucophage) 850 mg tablet Take 1 tablet (850 mg) by mouth 2 times a day with meals. 180 tablet 1    montelukast (Singulair) 10 mg tablet TAKE ONE TABLET BY MOUTH EVERY DAY 90 tablet 1    omeprazole (PriLOSEC) 20 mg DR capsule Take by mouth once daily.      predniSONE (Deltasone) 10 mg tablet Take 4 tablets (40 mg) by mouth once daily for 3 days, THEN 3 tablets (30 mg) once daily for 3 days, THEN 2 tablets (20 mg) once daily for 3 days, THEN 1 tablet (10 mg) once daily for 3 days. 30 tablet 0    vit A,C and E-lutein-minerals (Ocuvite with Lutein) 300 mcg-200 mg-27 mg-2 mg tablet Take 1 tablet by mouth once daily.      Wixela Inhub 250-50 mcg/dose diskus inhaler Inhale 1 puff once daily. Rinse mouth with water after use to reduce aftertaste and incidence of candidiasis. Do not swallow. 60 each 1    levothyroxine (Synthroid, Levoxyl) 112 mcg tablet Take 1 tablet (112 mcg) by mouth once daily. (Patient not taking: Reported on 7/11/2024) 90 tablet 1     No current facility-administered medications for this visit.        Allergies   Allergen Reactions    Bacitracin Other     swollen red eyes    Lisinopril Swelling and  Angioedema    Codeine Other        Social History     Socioeconomic History    Marital status:    Tobacco Use    Smoking status: Never     Passive exposure: Never    Smokeless tobacco: Never   Vaping Use    Vaping status: Never Used   Substance and Sexual Activity    Alcohol use: Yes     Alcohol/week: 1.0 standard drink of alcohol     Types: 1 Glasses of wine per week     Comment: occasional    Drug use: Never     Social Determinants of Health     Food Insecurity: No Food Insecurity (5/29/2024)    Hunger Vital Sign     Worried About Running Out of Food in the Last Year: Never true     Ran Out of Food in the Last Year: Never true   Lives alone  Retired used podiatric assistant  no smoking alcohol or drug use     Review of Systems:  Constitutional:  Denies fever or chills, malaise, weight changes.   Eyes:  Denies change in visual acuity   HENT:  Denies nasal congestion or sore throat   Respiratory:  Denies cough or shortness of breath   Cardiovascular:  Denies chest pain or edema   GI:  Denies abdominal pain, nausea, vomiting, bloody stools or diarrhea   :  Denies dysuria   Integument:  Denies rash   Neurologic:  As per HPI  MSK: Per above HPI  Endocrine:  Denies polyuria or polydipsia   Lymphatic:  Denies swollen glands   Psychiatric:  Denies depression or anxiety            PHYSICAL EXAM:  BP (!) 179/95 (BP Location: Right arm, Patient Position: Sitting)   Pulse 99   Resp 20     General:  NAD, well developed, F      Psychiatric: appropriate mood & affect.   Cardiovascular:  Normal pedal pulses; no LE edema.  Respiratory:  Normal rate; unlabored breathing.  Skin:  Intact; no erythema; no ecchymosis or rash.  Lymphatic:  No lymphadenopathy or lymphedema.  NEURO:  Alert and appropriate. Speech fluent, conversing appropriately.   Motor:    Rt: HF 5/5, KE 5/5, KF 5/5, DF 5/5, EHL 5/5, PF 5/5.    Lt: HF 5/5, KE 5/5, KF 5/5, DF 5/5, EHL 5/5, PF 5/5.  Sensation:     Light touch: intact in the b/l L3-S1  "dermatomes.    Gait: Normal, narrow based gait.     There is no tenderness over the R gastroc, hamstrings or achilles.   Yulissa marija defect   Able to walk on heals and toes. Slightly more difficulty w does toe raises on R than left but no pain  No bruising       Impression: Tonja Castillo is a 79 y.o. F w pmh of asthma, htn, allergies, presenting with R calf \"swelling\"/ Exam most consistent with gastroc tear.     Plan:  Orders Placed This Encounter   Procedures    Referral to Sports Medicine     Likely gastroc muscle/tendon tear less likely lipoma given history  Referral for sports medicine for musculoskeletal ultrasound eval  Considering no pain r other deficits, likely would not need intervention  Recommend compression knee highs for LE bl venous insufficiency  Fu prn     Thank you for the consultation.     Kayla Ruvalcaba MD  Physical Medicine and Rehabilitation    "

## 2024-07-14 DIAGNOSIS — E11.69 TYPE 2 DIABETES MELLITUS WITH OBESITY (MULTI): ICD-10-CM

## 2024-07-14 DIAGNOSIS — E66.9 TYPE 2 DIABETES MELLITUS WITH OBESITY (MULTI): ICD-10-CM

## 2024-07-15 RX ORDER — METFORMIN HYDROCHLORIDE 850 MG/1
850 TABLET ORAL
Qty: 180 TABLET | Refills: 1 | Status: SHIPPED | OUTPATIENT
Start: 2024-07-15

## 2024-07-26 ENCOUNTER — PRE-ADMISSION TESTING (OUTPATIENT)
Dept: PREADMISSION TESTING | Facility: HOSPITAL | Age: 79
End: 2024-07-26
Payer: MEDICARE

## 2024-07-26 ENCOUNTER — APPOINTMENT (OUTPATIENT)
Dept: ORTHOPEDIC SURGERY | Facility: CLINIC | Age: 79
End: 2024-07-26
Payer: MEDICARE

## 2024-07-26 VITALS
DIASTOLIC BLOOD PRESSURE: 79 MMHG | HEART RATE: 85 BPM | HEIGHT: 63 IN | OXYGEN SATURATION: 97 % | TEMPERATURE: 97.9 F | SYSTOLIC BLOOD PRESSURE: 164 MMHG | BODY MASS INDEX: 29.75 KG/M2 | RESPIRATION RATE: 18 BRPM | WEIGHT: 167.9 LBS

## 2024-07-26 DIAGNOSIS — K43.9 VENTRAL HERNIA WITHOUT OBSTRUCTION OR GANGRENE: ICD-10-CM

## 2024-07-26 DIAGNOSIS — M79.661 RIGHT CALF PAIN: ICD-10-CM

## 2024-07-26 DIAGNOSIS — G56.00: ICD-10-CM

## 2024-07-26 DIAGNOSIS — I10 BENIGN ESSENTIAL HYPERTENSION: ICD-10-CM

## 2024-07-26 DIAGNOSIS — Z01.818 PRE-OP EVALUATION: Primary | ICD-10-CM

## 2024-07-26 DIAGNOSIS — G47.33 OBSTRUCTIVE SLEEP APNEA SYNDROME IN ADULT: ICD-10-CM

## 2024-07-26 DIAGNOSIS — E11.41: ICD-10-CM

## 2024-07-26 LAB
ANION GAP SERPL CALC-SCNC: 9 MMOL/L (ref 10–20)
BUN SERPL-MCNC: 16 MG/DL (ref 6–23)
CALCIUM SERPL-MCNC: 9.2 MG/DL (ref 8.6–10.3)
CHLORIDE SERPL-SCNC: 107 MMOL/L (ref 98–107)
CO2 SERPL-SCNC: 26 MMOL/L (ref 21–32)
CREAT SERPL-MCNC: 0.8 MG/DL (ref 0.5–1.05)
EGFRCR SERPLBLD CKD-EPI 2021: 76 ML/MIN/1.73M*2
ERYTHROCYTE [DISTWIDTH] IN BLOOD BY AUTOMATED COUNT: 14.4 % (ref 11.5–14.5)
GLUCOSE SERPL-MCNC: 154 MG/DL (ref 74–99)
HCT VFR BLD AUTO: 36.7 % (ref 36–46)
HGB BLD-MCNC: 11.9 G/DL (ref 12–16)
MCH RBC QN AUTO: 29.7 PG (ref 26–34)
MCHC RBC AUTO-ENTMCNC: 32.4 G/DL (ref 32–36)
MCV RBC AUTO: 92 FL (ref 80–100)
NRBC BLD-RTO: 0 /100 WBCS (ref 0–0)
PLATELET # BLD AUTO: 295 X10*3/UL (ref 150–450)
POTASSIUM SERPL-SCNC: 3.5 MMOL/L (ref 3.5–5.3)
RBC # BLD AUTO: 4.01 X10*6/UL (ref 4–5.2)
SODIUM SERPL-SCNC: 138 MMOL/L (ref 136–145)
WBC # BLD AUTO: 6.4 X10*3/UL (ref 4.4–11.3)

## 2024-07-26 PROCEDURE — 82374 ASSAY BLOOD CARBON DIOXIDE: CPT

## 2024-07-26 PROCEDURE — 36415 COLL VENOUS BLD VENIPUNCTURE: CPT

## 2024-07-26 PROCEDURE — 85027 COMPLETE CBC AUTOMATED: CPT

## 2024-07-26 ASSESSMENT — ENCOUNTER SYMPTOMS
PSYCHIATRIC NEGATIVE: 1
ROS GI COMMENTS: VENTRAL HERNIA
ENDOCRINE NEGATIVE: 1
CONSTITUTIONAL NEGATIVE: 1
RESPIRATORY NEGATIVE: 1
NEUROLOGICAL NEGATIVE: 1
CARDIOVASCULAR NEGATIVE: 1
EYES NEGATIVE: 1
ABDOMINAL PAIN: 1
ALLERGIC/IMMUNOLOGIC NEGATIVE: 1
HEMATOLOGIC/LYMPHATIC NEGATIVE: 1
MYALGIAS: 1

## 2024-07-26 ASSESSMENT — PAIN - FUNCTIONAL ASSESSMENT: PAIN_FUNCTIONAL_ASSESSMENT: 0-10

## 2024-07-26 ASSESSMENT — PAIN SCALES - GENERAL: PAINLEVEL_OUTOF10: 0 - NO PAIN

## 2024-07-26 ASSESSMENT — LIFESTYLE VARIABLES: SMOKING_STATUS: NONSMOKER

## 2024-07-26 NOTE — CPM/PAT H&P
CPM/PAT Evaluation       Name: Tonja Castillo (Tonja Castillo)  /Age: 1945/79 y.o.     In-Person       Chief Complaint: Scheduled for ventral/epigastric hernia repair under general anesthesia per Dr. Lockhart on 24.       Past Medical History:   Diagnosis Date    Anemia     Arthritis     back and thumbs    Asthma ([facility])     Colon cancer (Multi)     Cough, unspecified 2022    Delayed emergence from general anesthesia     Depression     Diabetes mellitus (Multi)     Diarrhea     Disease of thyroid gland     GERD (gastroesophageal reflux disease)     Hyperlipidemia     Hypertension     Personal history of other benign neoplasm     History of benign neoplasm of colon    Personal history of other endocrine, nutritional and metabolic disease     History of hyperlipidemia    Retinal detachment with single break, unspecified eye     Partial recent retinal detachment with single defect    Sleep apnea     Type 2 diabetes mellitus (Multi)     Vision loss     glasses    Vitreous degeneration, unspecified eye     Vitreous degeneration       Past Surgical History:   Procedure Laterality Date    COLONOSCOPY  10/10/2023    OTHER SURGICAL HISTORY  2020    Hemicolectomy    OTHER SURGICAL HISTORY  2020    Colon surgery    OTHER SURGICAL HISTORY  2020    Hip replacement    OTHER SURGICAL HISTORY  2020     section    OTHER SURGICAL HISTORY  2020    Cholecystectomy    OTHER SURGICAL HISTORY  2020    Amputation    OTHER SURGICAL HISTORY  2020    Colonoscopy    OTHER SURGICAL HISTORY  2020    Toe tenotomy    VENTRAL HERNIA REPAIR  10/11/2023       Patient  has no history on file for sexual activity.    Family History   Problem Relation Name Age of Onset    Hypertension Mother      Coronary artery disease Mother      Diabetes Father      Coronary artery disease Father      Hypertension Father         Allergies   Allergen Reactions    Bacitracin Other     swollen  red eyes    Lisinopril Swelling and Angioedema    Codeine Other       Prior to Admission medications    Medication Sig Start Date End Date Taking? Authorizing Provider   acetaminophen (Tylenol) 500 mg tablet Take by mouth every 6 hours if needed.    Historical Provider, MD   albuterol 90 mcg/actuation inhaler Inhale every 6 hours. 6/28/18   Historical Provider, MD   amLODIPine (Norvasc) 10 mg tablet Take 1 tablet (10 mg) by mouth once daily. 6/24/24 6/24/25  Melo Benjamin MD   amLODIPine (Norvasc) 10 mg tablet Take 1 tablet (10 mg) by mouth once daily. 6/25/24 6/25/25  Melo Benjamin MD   atorvastatin (Lipitor) 40 mg tablet Take 1 tablet (40 mg) by mouth once daily at bedtime. 2/22/24   Melo Benjamin MD   azelastine (Astelin) 137 mcg (0.1 %) nasal spray Administer 1 spray into each nostril 2 times a day. Use in each nostril as directed 7/2/24   Kali Reyna MD   buPROPion SR (Wellbutrin SR) 150 mg 12 hr tablet Take 1 tablet (150 mg) by mouth every 12 hours. 2/22/24   Melo Benjamin MD   cholecalciferol (Vitamin D-3) 50 mcg (2,000 unit) capsule Take by mouth.    Historical Provider, MD   cinnamon 500 mg capsule Take 4 capsules (2,000 mg) by mouth twice a day.    Historical Provider, MD   cyanocobalamin (Vitamin B-12) 1,000 mcg tablet Take 100 mcg by mouth 3 times a week.    Historical Provider, MD   ferrous sulfate 325 (65 Fe) MG tablet Take 1 tablet by mouth once daily. 4/22/20   Historical Provider, MD   fexofenadine (Allegra) 180 mg tablet Take 1 tablet (180 mg) by mouth once daily. 6/13/19   Historical Provider, MD   fluticasone (Flonase) 50 mcg/actuation nasal spray Administer 2 sprays into each nostril once daily. Shake gently. Before first use, prime pump. After use, clean tip and replace cap. 7/2/24 12/29/24  Kali Reyna MD   gabapentin (Neurontin) 300 mg capsule Take 1 capsule (300 mg) by mouth once daily at bedtime. 2/22/24 8/20/24  Melo Benjamin MD    hydrocortisone 2.5 % cream Apply topically 2 times a day as needed for irritation or rash. 5/29/24 5/29/25  Melo Benjamin MD   Klor-Con M10 10 mEq ER tablet TAKE 1 TABLET (10 MEQ) BY MOUTH ONCE DAILY 7/10/24   Melo Benjamin MD   levothyroxine (Synthroid, Levoxyl) 112 mcg tablet Take 1 tablet (112 mcg) by mouth early in the morning.. Take on an empty stomach at the same time each day, either 30 to 60 minutes prior to breakfast 6/21/24 6/21/25  Elsi Mcdaniel, APRN-CNS   magnesium oxide (Mag-Ox) 400 mg (241.3 mg magnesium) tablet TAKE 1 TABLET BY MOUTH DAILY 3/28/24   Melo Benjamin MD   metFORMIN (Glucophage) 850 mg tablet TAKE ONE TABLET BY MOUTH TWICE A DAY WITH MEALS 7/15/24   Melo Benjamin MD   montelukast (Singulair) 10 mg tablet TAKE ONE TABLET BY MOUTH EVERY DAY 7/9/24   Melo Benjamin MD   omeprazole (PriLOSEC) 20 mg DR capsule Take by mouth once daily. 6/13/19   Historical Provider, MD   vit A,C and E-lutein-minerals (Ocuvite with Lutein) 300 mcg-200 mg-27 mg-2 mg tablet Take 1 tablet by mouth once daily.    Historical Provider, MD Gaona Inhub 250-50 mcg/dose diskus inhaler Inhale 1 puff once daily. Rinse mouth with water after use to reduce aftertaste and incidence of candidiasis. Do not swallow. 7/2/24   Kali Reyna MD          Visit Vitals  /79   Pulse 85   Temp 36.6 °C (97.9 °F) (Temporal)   Resp 18       DASI Risk Score    No data to display       Caprini DVT Assessment      Flowsheet Row Most Recent Value   DVT Score 9   Current Status Major surgery planned, lasting 2-3 hours, Major surgery planned, lasting over 3 hours, Swollen legs   History Prior major surgery   Age Over 75 years   BMI 30 or less          Modified Frailty Index    No data to display       CHADS2 Stroke Risk  Current as of 19 minutes ago        N/A 3 to 100%: High Risk   2 to < 3%: Medium Risk   0 to < 2%: Low Risk     Last Change: N/A          This score determines the  "patient's risk of having a stroke if the patient has atrial fibrillation.        This score is not applicable to this patient. Components are not calculated.          Revised Cardiac Risk Index      Flowsheet Row Most Recent Value   Revised Cardiac Risk Calculator 1          Apfel Simplified Score      Flowsheet Row Most Recent Value   Apfel Simplified Score Calculator 3          Risk Analysis Index Results This Encounter    No data found in the last 1 encounters.       Stop Bang Score      Flowsheet Row Most Recent Value   Do you snore loudly? 0  [\"I don't think so, I have no idea\"]   Do you often feel tired or fatigued after your sleep? 1   Has anyone ever observed you stop breathing in your sleep? 0   Do you have or are you being treated for high blood pressure? 1   Recent BMI (Calculated) 29.8   Is BMI greater than 35 kg/m2? 0=No   Age older than 50 years old? 1=Yes   Is your neck circumference greater than 17 inches (Male) or 16 inches (Female)? 0   Gender - Male 0=No   STOP-BANG Total Score 3            Assessment and Plan:     Gastrointestinal:   Musculoskeletal:  Scheduled for ventral/epigastric hernia repair under general anesthesia per Dr. Lockhart on 8/7/24. Please see H&P.           "

## 2024-07-26 NOTE — H&P
History Of Present Illness  Tonja Castillo is a 79 y.o. female presenting with recurrent ventral/epigastric hernia. Patient states she had the right side portion repaired several months ago and has been doing well. She did not have mesh placed at that time. She states the binder does bother her however, as it digs into her ribs and groin/hips. The patient states that around 2024 she was lifting heavy bags of soil as well as had c/c/c which caused severe coughing and worsening of the hernia. She is scheduled for ventral/epigastric hernia repair under general anesthesia per Dr. Lockhart on 24.      Past Medical History  Past Medical History:   Diagnosis Date    Anemia     Arthritis     back and thumbs    Asthma ([facility]-HCC)     Colon cancer (Multi)     Cough, unspecified 2022    Delayed emergence from general anesthesia     Depression     Diabetes mellitus (Multi)     Diarrhea     Disease of thyroid gland     GERD (gastroesophageal reflux disease)     Hyperlipidemia     Hypertension     Personal history of other benign neoplasm     History of benign neoplasm of colon    Personal history of other endocrine, nutritional and metabolic disease     History of hyperlipidemia    Retinal detachment with single break, unspecified eye     Partial recent retinal detachment with single defect    Sleep apnea     Type 2 diabetes mellitus (Multi)     Vision loss     glasses    Vitreous degeneration, unspecified eye     Vitreous degeneration       Surgical History  Past Surgical History:   Procedure Laterality Date    COLONOSCOPY  10/10/2023    OTHER SURGICAL HISTORY  2020    Hemicolectomy    OTHER SURGICAL HISTORY  2020    Colon surgery    OTHER SURGICAL HISTORY  2020    Hip replacement    OTHER SURGICAL HISTORY  2020     section    OTHER SURGICAL HISTORY  2020    Cholecystectomy    OTHER SURGICAL HISTORY  2020    Amputation    OTHER SURGICAL HISTORY  2020    Colonoscopy     OTHER SURGICAL HISTORY  04/21/2020    Toe tenotomy    VENTRAL HERNIA REPAIR  10/11/2023        Social History  She reports that she has never smoked. She has never been exposed to tobacco smoke. She has never used smokeless tobacco. She reports that she does not currently use alcohol. She reports that she does not use drugs.    Family History  Family History   Problem Relation Name Age of Onset    Hypertension Mother      Coronary artery disease Mother      Diabetes Father      Coronary artery disease Father      Hypertension Father          Allergies  Bacitracin, Lisinopril, and Codeine    Review of Systems   Constitutional: Negative.    HENT: Negative.     Eyes: Negative.    Respiratory: Negative.     Cardiovascular: Negative.    Gastrointestinal:  Positive for abdominal pain.        Ventral hernia   Endocrine: Negative.    Genitourinary: Negative.    Musculoskeletal:  Positive for myalgias.        Chronic right calf pain/edema, states has upcoming appointments with ortho/sports management for a potential tear.    Skin: Negative.    Allergic/Immunologic: Negative.    Neurological: Negative.    Hematological: Negative.    Psychiatric/Behavioral: Negative.     All other systems reviewed and are negative.       Physical Exam  Vitals and nursing note reviewed.   Constitutional:       Appearance: Normal appearance.   HENT:      Head: Normocephalic.      Nose: Nose normal.      Mouth/Throat:      Comments:   Mallampati: 2  TMD: >3  Finger breadth: 3  Dentition:  WNL  Neck ROM: full   Eyes:      Pupils: Pupils are equal, round, and reactive to light.   Cardiovascular:      Rate and Rhythm: Normal rate and regular rhythm.      Heart sounds: S1 normal and S2 normal. Murmur heard.      Systolic murmur is present with a grade of 1/6.      Comments: 1/6 systolic murmur heard best over the left anterior sternal boarder.  Moses lower leg/ankle edema. Also has some noted swelling to the right calf s/p injury.   Pulmonary:       "Effort: Pulmonary effort is normal.      Breath sounds: Normal breath sounds.      Comments: Lungs clear throughout all fields.   Abdominal:      General: Bowel sounds are normal.      Palpations: Abdomen is soft.      Hernia: A hernia is present. Hernia is present in the ventral area.      Comments: Bowel sounds active x4  Large left hernia noted.    Musculoskeletal:         General: Normal range of motion.      Cervical back: Normal range of motion.      Right lower le+ Edema present.      Left lower le+ Edema present.      Comments: Right calf edema, possible tear.   Hx of trauma to area/previous fall    Skin:     General: Skin is warm and dry.   Neurological:      General: No focal deficit present.      Mental Status: She is alert and oriented to person, place, and time.   Psychiatric:         Mood and Affect: Mood normal.         Behavior: Behavior normal.         Thought Content: Thought content normal.         Judgment: Judgment normal.          Last Recorded Vitals  Blood pressure 164/79, pulse 85, temperature 36.6 °C (97.9 °F), temperature source Temporal, resp. rate 18, height 1.6 m (5' 3\"), weight 76.2 kg (167 lb 14.4 oz), SpO2 97%.      Current Outpatient Medications:     acetaminophen (Tylenol) 500 mg tablet, Take by mouth every 6 hours if needed., Disp: , Rfl:     albuterol 90 mcg/actuation inhaler, Inhale every 6 hours., Disp: , Rfl:     amLODIPine (Norvasc) 10 mg tablet, Take 1 tablet (10 mg) by mouth once daily., Disp: 90 tablet, Rfl: 2    amLODIPine (Norvasc) 10 mg tablet, Take 1 tablet (10 mg) by mouth once daily., Disp: 90 tablet, Rfl: 3    atorvastatin (Lipitor) 40 mg tablet, Take 1 tablet (40 mg) by mouth once daily at bedtime., Disp: 90 tablet, Rfl: 1    azelastine (Astelin) 137 mcg (0.1 %) nasal spray, Administer 1 spray into each nostril 2 times a day. Use in each nostril as directed, Disp: 30 mL, Rfl: 0    buPROPion SR (Wellbutrin SR) 150 mg 12 hr tablet, Take 1 tablet (150 mg) by " mouth every 12 hours., Disp: 180 tablet, Rfl: 1    cholecalciferol (Vitamin D-3) 50 mcg (2,000 unit) capsule, Take by mouth., Disp: , Rfl:     cinnamon 500 mg capsule, Take 4 capsules (2,000 mg) by mouth twice a day., Disp: , Rfl:     cyanocobalamin (Vitamin B-12) 1,000 mcg tablet, Take 100 mcg by mouth 3 times a week., Disp: , Rfl:     ferrous sulfate 325 (65 Fe) MG tablet, Take 1 tablet by mouth once daily., Disp: , Rfl:     fexofenadine (Allegra) 180 mg tablet, Take 1 tablet (180 mg) by mouth once daily., Disp: , Rfl:     fluticasone (Flonase) 50 mcg/actuation nasal spray, Administer 2 sprays into each nostril once daily. Shake gently. Before first use, prime pump. After use, clean tip and replace cap., Disp: 16 g, Rfl: 11    gabapentin (Neurontin) 300 mg capsule, Take 1 capsule (300 mg) by mouth once daily at bedtime., Disp: 90 capsule, Rfl: 1    hydrocortisone 2.5 % cream, Apply topically 2 times a day as needed for irritation or rash., Disp: 30 g, Rfl: 2    Klor-Con M10 10 mEq ER tablet, TAKE 1 TABLET (10 MEQ) BY MOUTH ONCE DAILY, Disp: 90 tablet, Rfl: 1    levothyroxine (Synthroid, Levoxyl) 112 mcg tablet, Take 1 tablet (112 mcg) by mouth early in the morning.. Take on an empty stomach at the same time each day, either 30 to 60 minutes prior to breakfast, Disp: 30 tablet, Rfl: 0    magnesium oxide (Mag-Ox) 400 mg (241.3 mg magnesium) tablet, TAKE 1 TABLET BY MOUTH DAILY, Disp: 90 tablet, Rfl: 1    metFORMIN (Glucophage) 850 mg tablet, TAKE ONE TABLET BY MOUTH TWICE A DAY WITH MEALS, Disp: 180 tablet, Rfl: 1    montelukast (Singulair) 10 mg tablet, TAKE ONE TABLET BY MOUTH EVERY DAY, Disp: 90 tablet, Rfl: 1    omeprazole (PriLOSEC) 20 mg DR capsule, Take by mouth once daily., Disp: , Rfl:     vit A,C and E-lutein-minerals (Ocuvite with Lutein) 300 mcg-200 mg-27 mg-2 mg tablet, Take 1 tablet by mouth once daily., Disp: , Rfl:     Wixela Inhub 250-50 mcg/dose diskus inhaler, Inhale 1 puff once daily. Rinse mouth  with water after use to reduce aftertaste and incidence of candidiasis. Do not swallow., Disp: 60 each, Rfl: 1     Relevant Results    Results for orders placed or performed in visit on 07/26/24 (from the past 24 hour(s))   Basic Metabolic Panel   Result Value Ref Range    Glucose 154 (H) 74 - 99 mg/dL    Sodium 138 136 - 145 mmol/L    Potassium 3.5 3.5 - 5.3 mmol/L    Chloride 107 98 - 107 mmol/L    Bicarbonate 26 21 - 32 mmol/L    Anion Gap 9 (L) 10 - 20 mmol/L    Urea Nitrogen 16 6 - 23 mg/dL    Creatinine 0.80 0.50 - 1.05 mg/dL    eGFR 76 >60 mL/min/1.73m*2    Calcium 9.2 8.6 - 10.3 mg/dL   CBC   Result Value Ref Range    WBC 6.4 4.4 - 11.3 x10*3/uL    nRBC 0.0 0.0 - 0.0 /100 WBCs    RBC 4.01 4.00 - 5.20 x10*6/uL    Hemoglobin 11.9 (L) 12.0 - 16.0 g/dL    Hematocrit 36.7 36.0 - 46.0 %    MCV 92 80 - 100 fL    MCH 29.7 26.0 - 34.0 pg    MCHC 32.4 32.0 - 36.0 g/dL    RDW 14.4 11.5 - 14.5 %    Platelets 295 150 - 450 x10*3/uL               Assessment/Plan   Problem List Items Addressed This Visit       Benign essential hypertension    Relevant Orders    Basic Metabolic Panel (Completed)    ECG 12 Lead    Type 2 diabetes mellitus with carpal tunnel syndrome (Multi)    Relevant Orders    Basic Metabolic Panel (Completed)    ECG 12 Lead    Obstructive sleep apnea syndrome in adult    Relevant Orders    ECG 12 Lead    Ventral hernia without obstruction or gangrene    Relevant Orders    Basic Metabolic Panel (Completed)    CBC (Completed)    ECG 12 Lead     Other Visit Diagnoses       Pre-op evaluation    -  Primary    Relevant Orders    Basic Metabolic Panel (Completed)    CBC (Completed)    ECG 12 Lead          Scheduled for ventral/epigastric hernia repair under general anesthesia per Dr. Lockhart on 8/7/24.   CBC, BMP ordered. Reviewed. Slightly low Hgb at 11.9.   EKG ordered. SR.   All surgery instructions reviewed with patient by RN. Verbalized understanding.   Patient takes her Amlodipine at HS. So she will take  it the night prior to surgery.   Patient is going to bring her binder with her on DOS to show fit to physician/nurses. She may need a different size after this upcoming surgery.     Hx Right calf injury  Continue to follow up with ortho/sports management.  Encouraged early ambulation, leg exercises s/p surgery for DVT prevention. Patient verbalized understanding.       I spent 30 minutes in the professional and overall care of this patient.      Reva Gabriel, APRN-CNS

## 2024-07-26 NOTE — PREPROCEDURE INSTRUCTIONS
Medication List            Accurate as of July 26, 2024  8:12 AM. Always use your most recent med list.                acetaminophen 500 mg tablet  Commonly known as: Tylenol  Medication Adjustments for Surgery: Continue until night before surgery     albuterol 90 mcg/actuation inhaler  Medication Adjustments for Surgery: Take morning of surgery with sip of water, no other fluids  Notes to patient: Please bring with you day of surgery     * amLODIPine 10 mg tablet  Commonly known as: Norvasc  Take 1 tablet (10 mg) by mouth once daily.  Medication Adjustments for Surgery: Continue until night before surgery     * amLODIPine 10 mg tablet  Commonly known as: Norvasc  Take 1 tablet (10 mg) by mouth once daily.  Notes to patient: duplicate     atorvastatin 40 mg tablet  Commonly known as: Lipitor  Take 1 tablet (40 mg) by mouth once daily at bedtime.  Medication Adjustments for Surgery: Continue until night before surgery     azelastine 137 mcg (0.1 %) nasal spray  Commonly known as: Astelin  Administer 1 spray into each nostril 2 times a day. Use in each nostril as directed  Medication Adjustments for Surgery: Continue until night before surgery     buPROPion  mg 12 hr tablet  Commonly known as: Wellbutrin SR  Take 1 tablet (150 mg) by mouth every 12 hours.  Medication Adjustments for Surgery: Continue until night before surgery     cholecalciferol 50 mcg (2,000 unit) capsule  Commonly known as: Vitamin D-3  Medication Adjustments for Surgery: Continue until night before surgery     cinnamon 500 mg capsule  Medication Adjustments for Surgery: Stop 7 days before surgery     cyanocobalamin 1,000 mcg tablet  Commonly known as: Vitamin B-12  Medication Adjustments for Surgery: Stop 7 days before surgery     ferrous sulfate (325 mg ferrous sulfate) tablet  Medication Adjustments for Surgery: Stop 7 days before surgery     fexofenadine 180 mg tablet  Commonly known as: Allegra  Medication Adjustments for Surgery:  Continue until night before surgery     fluticasone 50 mcg/actuation nasal spray  Commonly known as: Flonase  Administer 2 sprays into each nostril once daily. Shake gently. Before first use, prime pump. After use, clean tip and replace cap.  Medication Adjustments for Surgery: Continue until night before surgery     gabapentin 300 mg capsule  Commonly known as: Neurontin  Take 1 capsule (300 mg) by mouth once daily at bedtime.  Medication Adjustments for Surgery: Continue until night before surgery     hydrocortisone 2.5 % cream  Apply topically 2 times a day as needed for irritation or rash.  Medication Adjustments for Surgery: Continue until night before surgery     Klor-Con M10 10 mEq ER tablet  Generic drug: potassium chloride CR  TAKE 1 TABLET (10 MEQ) BY MOUTH ONCE DAILY  Medication Adjustments for Surgery: Continue until night before surgery     levothyroxine 112 mcg tablet  Commonly known as: Synthroid, Levoxyl  Take 1 tablet (112 mcg) by mouth early in the morning.. Take on an empty stomach at the same time each day, either 30 to 60 minutes prior to breakfast  Medication Adjustments for Surgery: Continue until night before surgery     magnesium oxide 400 mg (241.3 mg magnesium) tablet  Commonly known as: Mag-Ox  TAKE 1 TABLET BY MOUTH DAILY  Medication Adjustments for Surgery: Stop 7 days before surgery     metFORMIN 850 mg tablet  Commonly known as: Glucophage  TAKE ONE TABLET BY MOUTH TWICE A DAY WITH MEALS  Medication Adjustments for Surgery: Continue until night before surgery     montelukast 10 mg tablet  Commonly known as: Singulair  TAKE ONE TABLET BY MOUTH EVERY DAY  Medication Adjustments for Surgery: Continue until night before surgery     Ocuvite with Lutein 300 mcg-200 mg-27 mg-2 mg tablet  Generic drug: vit A,C and E-lutein-minerals  Medication Adjustments for Surgery: Stop 7 days before surgery     omeprazole 20 mg DR capsule  Commonly known as: PriLOSEC  Medication Adjustments for Surgery:  Continue until night before surgery     Wixela Inhub 250-50 mcg/dose diskus inhaler  Generic drug: fluticasone propion-salmeteroL  Inhale 1 puff once daily. Rinse mouth with water after use to reduce aftertaste and incidence of candidiasis. Do not swallow.  Medication Adjustments for Surgery: Take morning of surgery with sip of water, no other fluids           * This list has 2 medication(s) that are the same as other medications prescribed for you. Read the directions carefully, and ask your doctor or other care provider to review them with you.                       NPO Instructions:     Do not drink any liquid after midnight the night before your surgery  Do not eat any food after midnight the night before your surgery/procedure.  Candy, gum, mints and smoking of cigarettes, marijuana or vaping is not permitted after midnight prior to your surgery   Do not drink Alcohol 24 hours prior to surgery      Increase fluid intake day before surgery    Additional Instructions:      Review your medication instructions, take indicated medications  If you have diabetes, please check your fasting blood sugar upon awakening.  If fasting blood sugar is <80 mg/dl, drink 100 ml of apple juice, time limit of 2 hours before  Wear  comfortable loose fitting clothing  Do not use moisturizers, creams, lotions or perfume  All jewelry and valuables should be left at home. May bring glasses and partials.    Stop blood thinning medications as instructed by ordering physician or surgeon    Shower or bathe the night before and day of surgery use antimicrobial scrub as ordered. Brush teeth and avoid perfumes, colognes, powders, makeup, aftershave and hair spray    Please have a responsible adult to drive you home and be available to help you as needed after surgery.

## 2024-07-26 NOTE — H&P (VIEW-ONLY)
History Of Present Illness  Tonaj Castillo is a 79 y.o. female presenting with recurrent ventral/epigastric hernia. Patient states she had the right side portion repaired several months ago and has been doing well. She did not have mesh placed at that time. She states the binder does bother her however, as it digs into her ribs and groin/hips. The patient states that around 2024 she was lifting heavy bags of soil as well as had c/c/c which caused severe coughing and worsening of the hernia. She is scheduled for ventral/epigastric hernia repair under general anesthesia per Dr. Lockhart on 24.      Past Medical History  Past Medical History:   Diagnosis Date    Anemia     Arthritis     back and thumbs    Asthma (HHS-HCC)     Colon cancer (Multi)     Cough, unspecified 2022    Delayed emergence from general anesthesia     Depression     Diabetes mellitus (Multi)     Diarrhea     Disease of thyroid gland     GERD (gastroesophageal reflux disease)     Hyperlipidemia     Hypertension     Personal history of other benign neoplasm     History of benign neoplasm of colon    Personal history of other endocrine, nutritional and metabolic disease     History of hyperlipidemia    Retinal detachment with single break, unspecified eye     Partial recent retinal detachment with single defect    Sleep apnea     Type 2 diabetes mellitus (Multi)     Vision loss     glasses    Vitreous degeneration, unspecified eye     Vitreous degeneration       Surgical History  Past Surgical History:   Procedure Laterality Date    COLONOSCOPY  10/10/2023    OTHER SURGICAL HISTORY  2020    Hemicolectomy    OTHER SURGICAL HISTORY  2020    Colon surgery    OTHER SURGICAL HISTORY  2020    Hip replacement    OTHER SURGICAL HISTORY  2020     section    OTHER SURGICAL HISTORY  2020    Cholecystectomy    OTHER SURGICAL HISTORY  2020    Amputation    OTHER SURGICAL HISTORY  2020    Colonoscopy     OTHER SURGICAL HISTORY  04/21/2020    Toe tenotomy    VENTRAL HERNIA REPAIR  10/11/2023        Social History  She reports that she has never smoked. She has never been exposed to tobacco smoke. She has never used smokeless tobacco. She reports that she does not currently use alcohol. She reports that she does not use drugs.    Family History  Family History   Problem Relation Name Age of Onset    Hypertension Mother      Coronary artery disease Mother      Diabetes Father      Coronary artery disease Father      Hypertension Father          Allergies  Bacitracin, Lisinopril, and Codeine    Review of Systems   Constitutional: Negative.    HENT: Negative.     Eyes: Negative.    Respiratory: Negative.     Cardiovascular: Negative.    Gastrointestinal:  Positive for abdominal pain.        Ventral hernia   Endocrine: Negative.    Genitourinary: Negative.    Musculoskeletal:  Positive for myalgias.        Chronic right calf pain/edema, states has upcoming appointments with ortho/sports management for a potential tear.    Skin: Negative.    Allergic/Immunologic: Negative.    Neurological: Negative.    Hematological: Negative.    Psychiatric/Behavioral: Negative.     All other systems reviewed and are negative.       Physical Exam  Vitals and nursing note reviewed.   Constitutional:       Appearance: Normal appearance.   HENT:      Head: Normocephalic.      Nose: Nose normal.      Mouth/Throat:      Comments:   Mallampati: 2  TMD: >3  Finger breadth: 3  Dentition:  WNL  Neck ROM: full   Eyes:      Pupils: Pupils are equal, round, and reactive to light.   Cardiovascular:      Rate and Rhythm: Normal rate and regular rhythm.      Heart sounds: S1 normal and S2 normal. Murmur heard.      Systolic murmur is present with a grade of 1/6.      Comments: 1/6 systolic murmur heard best over the left anterior sternal boarder.  Moses lower leg/ankle edema. Also has some noted swelling to the right calf s/p injury.   Pulmonary:       "Effort: Pulmonary effort is normal.      Breath sounds: Normal breath sounds.      Comments: Lungs clear throughout all fields.   Abdominal:      General: Bowel sounds are normal.      Palpations: Abdomen is soft.      Hernia: A hernia is present. Hernia is present in the ventral area.      Comments: Bowel sounds active x4  Large left hernia noted.    Musculoskeletal:         General: Normal range of motion.      Cervical back: Normal range of motion.      Right lower le+ Edema present.      Left lower le+ Edema present.      Comments: Right calf edema, possible tear.   Hx of trauma to area/previous fall    Skin:     General: Skin is warm and dry.   Neurological:      General: No focal deficit present.      Mental Status: She is alert and oriented to person, place, and time.   Psychiatric:         Mood and Affect: Mood normal.         Behavior: Behavior normal.         Thought Content: Thought content normal.         Judgment: Judgment normal.          Last Recorded Vitals  Blood pressure 164/79, pulse 85, temperature 36.6 °C (97.9 °F), temperature source Temporal, resp. rate 18, height 1.6 m (5' 3\"), weight 76.2 kg (167 lb 14.4 oz), SpO2 97%.      Current Outpatient Medications:     acetaminophen (Tylenol) 500 mg tablet, Take by mouth every 6 hours if needed., Disp: , Rfl:     albuterol 90 mcg/actuation inhaler, Inhale every 6 hours., Disp: , Rfl:     amLODIPine (Norvasc) 10 mg tablet, Take 1 tablet (10 mg) by mouth once daily., Disp: 90 tablet, Rfl: 2    amLODIPine (Norvasc) 10 mg tablet, Take 1 tablet (10 mg) by mouth once daily., Disp: 90 tablet, Rfl: 3    atorvastatin (Lipitor) 40 mg tablet, Take 1 tablet (40 mg) by mouth once daily at bedtime., Disp: 90 tablet, Rfl: 1    azelastine (Astelin) 137 mcg (0.1 %) nasal spray, Administer 1 spray into each nostril 2 times a day. Use in each nostril as directed, Disp: 30 mL, Rfl: 0    buPROPion SR (Wellbutrin SR) 150 mg 12 hr tablet, Take 1 tablet (150 mg) by " mouth every 12 hours., Disp: 180 tablet, Rfl: 1    cholecalciferol (Vitamin D-3) 50 mcg (2,000 unit) capsule, Take by mouth., Disp: , Rfl:     cinnamon 500 mg capsule, Take 4 capsules (2,000 mg) by mouth twice a day., Disp: , Rfl:     cyanocobalamin (Vitamin B-12) 1,000 mcg tablet, Take 100 mcg by mouth 3 times a week., Disp: , Rfl:     ferrous sulfate 325 (65 Fe) MG tablet, Take 1 tablet by mouth once daily., Disp: , Rfl:     fexofenadine (Allegra) 180 mg tablet, Take 1 tablet (180 mg) by mouth once daily., Disp: , Rfl:     fluticasone (Flonase) 50 mcg/actuation nasal spray, Administer 2 sprays into each nostril once daily. Shake gently. Before first use, prime pump. After use, clean tip and replace cap., Disp: 16 g, Rfl: 11    gabapentin (Neurontin) 300 mg capsule, Take 1 capsule (300 mg) by mouth once daily at bedtime., Disp: 90 capsule, Rfl: 1    hydrocortisone 2.5 % cream, Apply topically 2 times a day as needed for irritation or rash., Disp: 30 g, Rfl: 2    Klor-Con M10 10 mEq ER tablet, TAKE 1 TABLET (10 MEQ) BY MOUTH ONCE DAILY, Disp: 90 tablet, Rfl: 1    levothyroxine (Synthroid, Levoxyl) 112 mcg tablet, Take 1 tablet (112 mcg) by mouth early in the morning.. Take on an empty stomach at the same time each day, either 30 to 60 minutes prior to breakfast, Disp: 30 tablet, Rfl: 0    magnesium oxide (Mag-Ox) 400 mg (241.3 mg magnesium) tablet, TAKE 1 TABLET BY MOUTH DAILY, Disp: 90 tablet, Rfl: 1    metFORMIN (Glucophage) 850 mg tablet, TAKE ONE TABLET BY MOUTH TWICE A DAY WITH MEALS, Disp: 180 tablet, Rfl: 1    montelukast (Singulair) 10 mg tablet, TAKE ONE TABLET BY MOUTH EVERY DAY, Disp: 90 tablet, Rfl: 1    omeprazole (PriLOSEC) 20 mg DR capsule, Take by mouth once daily., Disp: , Rfl:     vit A,C and E-lutein-minerals (Ocuvite with Lutein) 300 mcg-200 mg-27 mg-2 mg tablet, Take 1 tablet by mouth once daily., Disp: , Rfl:     Wixela Inhub 250-50 mcg/dose diskus inhaler, Inhale 1 puff once daily. Rinse mouth  with water after use to reduce aftertaste and incidence of candidiasis. Do not swallow., Disp: 60 each, Rfl: 1     Relevant Results    Results for orders placed or performed in visit on 07/26/24 (from the past 24 hour(s))   Basic Metabolic Panel   Result Value Ref Range    Glucose 154 (H) 74 - 99 mg/dL    Sodium 138 136 - 145 mmol/L    Potassium 3.5 3.5 - 5.3 mmol/L    Chloride 107 98 - 107 mmol/L    Bicarbonate 26 21 - 32 mmol/L    Anion Gap 9 (L) 10 - 20 mmol/L    Urea Nitrogen 16 6 - 23 mg/dL    Creatinine 0.80 0.50 - 1.05 mg/dL    eGFR 76 >60 mL/min/1.73m*2    Calcium 9.2 8.6 - 10.3 mg/dL   CBC   Result Value Ref Range    WBC 6.4 4.4 - 11.3 x10*3/uL    nRBC 0.0 0.0 - 0.0 /100 WBCs    RBC 4.01 4.00 - 5.20 x10*6/uL    Hemoglobin 11.9 (L) 12.0 - 16.0 g/dL    Hematocrit 36.7 36.0 - 46.0 %    MCV 92 80 - 100 fL    MCH 29.7 26.0 - 34.0 pg    MCHC 32.4 32.0 - 36.0 g/dL    RDW 14.4 11.5 - 14.5 %    Platelets 295 150 - 450 x10*3/uL               Assessment/Plan   Problem List Items Addressed This Visit       Benign essential hypertension    Relevant Orders    Basic Metabolic Panel (Completed)    ECG 12 Lead    Type 2 diabetes mellitus with carpal tunnel syndrome (Multi)    Relevant Orders    Basic Metabolic Panel (Completed)    ECG 12 Lead    Obstructive sleep apnea syndrome in adult    Relevant Orders    ECG 12 Lead    Ventral hernia without obstruction or gangrene    Relevant Orders    Basic Metabolic Panel (Completed)    CBC (Completed)    ECG 12 Lead     Other Visit Diagnoses       Pre-op evaluation    -  Primary    Relevant Orders    Basic Metabolic Panel (Completed)    CBC (Completed)    ECG 12 Lead          Scheduled for ventral/epigastric hernia repair under general anesthesia per Dr. Lockhart on 8/7/24.   CBC, BMP ordered. Reviewed. Slightly low Hgb at 11.9.   EKG ordered. SR.   All surgery instructions reviewed with patient by RN. Verbalized understanding.   Patient takes her Amlodipine at HS. So she will take  it the night prior to surgery.   Patient is going to bring her binder with her on DOS to show fit to physician/nurses. She may need a different size after this upcoming surgery.     Hx Right calf injury  Continue to follow up with ortho/sports management.  Encouraged early ambulation, leg exercises s/p surgery for DVT prevention. Patient verbalized understanding.       I spent 30 minutes in the professional and overall care of this patient.      Reva Gabriel, APRN-CNS

## 2024-08-03 ENCOUNTER — ANESTHESIA EVENT (OUTPATIENT)
Dept: OPERATING ROOM | Facility: HOSPITAL | Age: 79
End: 2024-08-03
Payer: MEDICARE

## 2024-08-07 ENCOUNTER — HOSPITAL ENCOUNTER (INPATIENT)
Facility: HOSPITAL | Age: 79
LOS: 1 days | Discharge: HOME | End: 2024-08-07
Attending: SURGERY | Admitting: SURGERY
Payer: MEDICARE

## 2024-08-07 ENCOUNTER — ANESTHESIA (OUTPATIENT)
Dept: OPERATING ROOM | Facility: HOSPITAL | Age: 79
End: 2024-08-07
Payer: MEDICARE

## 2024-08-07 VITALS
OXYGEN SATURATION: 96 % | HEART RATE: 95 BPM | RESPIRATION RATE: 10 BRPM | DIASTOLIC BLOOD PRESSURE: 89 MMHG | HEIGHT: 63 IN | SYSTOLIC BLOOD PRESSURE: 134 MMHG | BODY MASS INDEX: 29.23 KG/M2 | WEIGHT: 165 LBS | TEMPERATURE: 98.6 F

## 2024-08-07 DIAGNOSIS — K43.9 VENTRAL HERNIA WITHOUT OBSTRUCTION OR GANGRENE: Primary | ICD-10-CM

## 2024-08-07 PROCEDURE — 7100000002 HC RECOVERY ROOM TIME - EACH INCREMENTAL 1 MINUTE: Performed by: SURGERY

## 2024-08-07 PROCEDURE — 2500000004 HC RX 250 GENERAL PHARMACY W/ HCPCS (ALT 636 FOR OP/ED): Performed by: SURGERY

## 2024-08-07 PROCEDURE — 2500000005 HC RX 250 GENERAL PHARMACY W/O HCPCS: Performed by: NURSE ANESTHETIST, CERTIFIED REGISTERED

## 2024-08-07 PROCEDURE — 3700000002 HC GENERAL ANESTHESIA TIME - EACH INCREMENTAL 1 MINUTE: Performed by: SURGERY

## 2024-08-07 PROCEDURE — 2500000004 HC RX 250 GENERAL PHARMACY W/ HCPCS (ALT 636 FOR OP/ED): Performed by: ANESTHESIOLOGY

## 2024-08-07 PROCEDURE — 7100000010 HC PHASE TWO TIME - EACH INCREMENTAL 1 MINUTE: Performed by: SURGERY

## 2024-08-07 PROCEDURE — 2500000004 HC RX 250 GENERAL PHARMACY W/ HCPCS (ALT 636 FOR OP/ED): Performed by: NURSE ANESTHETIST, CERTIFIED REGISTERED

## 2024-08-07 PROCEDURE — 2780000003 HC OR 278 NO HCPCS: Performed by: SURGERY

## 2024-08-07 PROCEDURE — 3700000001 HC GENERAL ANESTHESIA TIME - INITIAL BASE CHARGE: Performed by: SURGERY

## 2024-08-07 PROCEDURE — 1210000001 HC SEMI-PRIVATE ROOM DAILY

## 2024-08-07 PROCEDURE — 7100000009 HC PHASE TWO TIME - INITIAL BASE CHARGE: Performed by: SURGERY

## 2024-08-07 PROCEDURE — 2500000001 HC RX 250 WO HCPCS SELF ADMINISTERED DRUGS (ALT 637 FOR MEDICARE OP): Performed by: ANESTHESIOLOGY

## 2024-08-07 PROCEDURE — 2720000007 HC OR 272 NO HCPCS: Performed by: SURGERY

## 2024-08-07 PROCEDURE — 3600000008 HC OR TIME - EACH INCREMENTAL 1 MINUTE - PROCEDURE LEVEL THREE: Performed by: SURGERY

## 2024-08-07 PROCEDURE — 0WUF0JZ SUPPLEMENT ABDOMINAL WALL WITH SYNTHETIC SUBSTITUTE, OPEN APPROACH: ICD-10-PCS | Performed by: SURGERY

## 2024-08-07 PROCEDURE — 7100000001 HC RECOVERY ROOM TIME - INITIAL BASE CHARGE: Performed by: SURGERY

## 2024-08-07 PROCEDURE — 3600000003 HC OR TIME - INITIAL BASE CHARGE - PROCEDURE LEVEL THREE: Performed by: SURGERY

## 2024-08-07 PROCEDURE — 2500000004 HC RX 250 GENERAL PHARMACY W/ HCPCS (ALT 636 FOR OP/ED): Mod: JZ | Performed by: SURGERY

## 2024-08-07 PROCEDURE — C1781 MESH (IMPLANTABLE): HCPCS | Performed by: SURGERY

## 2024-08-07 PROCEDURE — 2500000002 HC RX 250 W HCPCS SELF ADMINISTERED DRUGS (ALT 637 FOR MEDICARE OP, ALT 636 FOR OP/ED): Performed by: ANESTHESIOLOGY

## 2024-08-07 DEVICE — PATCH, HERNIA, VENTRALEX ST, LRG, 3.2 X 3.2: Type: IMPLANTABLE DEVICE | Site: ABDOMEN | Status: FUNCTIONAL

## 2024-08-07 RX ORDER — OXYCODONE HYDROCHLORIDE 5 MG/1
5 TABLET ORAL EVERY 6 HOURS PRN
Qty: 12 TABLET | Refills: 0 | Status: SHIPPED | OUTPATIENT
Start: 2024-08-07

## 2024-08-07 RX ORDER — ROCURONIUM BROMIDE 10 MG/ML
INJECTION, SOLUTION INTRAVENOUS AS NEEDED
Status: DISCONTINUED | OUTPATIENT
Start: 2024-08-07 | End: 2024-08-07

## 2024-08-07 RX ORDER — SODIUM CHLORIDE 9 MG/ML
50 INJECTION, SOLUTION INTRAVENOUS CONTINUOUS
Status: DISCONTINUED | OUTPATIENT
Start: 2024-08-07 | End: 2024-08-07 | Stop reason: HOSPADM

## 2024-08-07 RX ORDER — CEFAZOLIN SODIUM 2 G/100ML
2 INJECTION, SOLUTION INTRAVENOUS ONCE
Status: COMPLETED | OUTPATIENT
Start: 2024-08-07 | End: 2024-08-07

## 2024-08-07 RX ORDER — MORPHINE SULFATE 4 MG/ML
4 INJECTION INTRAVENOUS EVERY 5 MIN PRN
Status: DISCONTINUED | OUTPATIENT
Start: 2024-08-07 | End: 2024-08-07 | Stop reason: HOSPADM

## 2024-08-07 RX ORDER — MIDAZOLAM HYDROCHLORIDE 1 MG/ML
INJECTION, SOLUTION INTRAMUSCULAR; INTRAVENOUS AS NEEDED
Status: DISCONTINUED | OUTPATIENT
Start: 2024-08-07 | End: 2024-08-07

## 2024-08-07 RX ORDER — ONDANSETRON HYDROCHLORIDE 2 MG/ML
4 INJECTION, SOLUTION INTRAVENOUS ONCE
Status: COMPLETED | OUTPATIENT
Start: 2024-08-07 | End: 2024-08-07

## 2024-08-07 RX ORDER — FAMOTIDINE 10 MG/ML
20 INJECTION INTRAVENOUS ONCE
Status: COMPLETED | OUTPATIENT
Start: 2024-08-07 | End: 2024-08-07

## 2024-08-07 RX ORDER — LIDOCAINE HYDROCHLORIDE 10 MG/ML
INJECTION INFILTRATION; PERINEURAL AS NEEDED
Status: DISCONTINUED | OUTPATIENT
Start: 2024-08-07 | End: 2024-08-07

## 2024-08-07 RX ORDER — PROPOFOL 10 MG/ML
INJECTION, EMULSION INTRAVENOUS AS NEEDED
Status: DISCONTINUED | OUTPATIENT
Start: 2024-08-07 | End: 2024-08-07

## 2024-08-07 RX ORDER — HYDROMORPHONE HYDROCHLORIDE 1 MG/ML
INJECTION, SOLUTION INTRAMUSCULAR; INTRAVENOUS; SUBCUTANEOUS AS NEEDED
Status: DISCONTINUED | OUTPATIENT
Start: 2024-08-07 | End: 2024-08-07

## 2024-08-07 RX ORDER — ONDANSETRON HYDROCHLORIDE 2 MG/ML
4 INJECTION, SOLUTION INTRAVENOUS ONCE AS NEEDED
Status: DISCONTINUED | OUTPATIENT
Start: 2024-08-07 | End: 2024-08-07 | Stop reason: HOSPADM

## 2024-08-07 RX ORDER — MORPHINE SULFATE 2 MG/ML
2 INJECTION, SOLUTION INTRAMUSCULAR; INTRAVENOUS EVERY 5 MIN PRN
Status: DISCONTINUED | OUTPATIENT
Start: 2024-08-07 | End: 2024-08-07 | Stop reason: HOSPADM

## 2024-08-07 RX ORDER — LORAZEPAM 2 MG/ML
0.25 INJECTION INTRAMUSCULAR ONCE
Status: COMPLETED | OUTPATIENT
Start: 2024-08-07 | End: 2024-08-07

## 2024-08-07 RX ORDER — BUPIVACAINE HYDROCHLORIDE 2.5 MG/ML
INJECTION, SOLUTION INFILTRATION; PERINEURAL AS NEEDED
Status: DISCONTINUED | OUTPATIENT
Start: 2024-08-07 | End: 2024-08-07 | Stop reason: HOSPADM

## 2024-08-07 RX ORDER — ONDANSETRON HYDROCHLORIDE 2 MG/ML
INJECTION, SOLUTION INTRAVENOUS AS NEEDED
Status: DISCONTINUED | OUTPATIENT
Start: 2024-08-07 | End: 2024-08-07

## 2024-08-07 RX ORDER — METOCLOPRAMIDE HYDROCHLORIDE 5 MG/ML
10 INJECTION INTRAMUSCULAR; INTRAVENOUS ONCE
Status: COMPLETED | OUTPATIENT
Start: 2024-08-07 | End: 2024-08-07

## 2024-08-07 RX ORDER — IPRATROPIUM BROMIDE AND ALBUTEROL SULFATE 2.5; .5 MG/3ML; MG/3ML
3 SOLUTION RESPIRATORY (INHALATION) ONCE
Status: COMPLETED | OUTPATIENT
Start: 2024-08-07 | End: 2024-08-07

## 2024-08-07 RX ORDER — FENTANYL CITRATE 50 UG/ML
INJECTION, SOLUTION INTRAMUSCULAR; INTRAVENOUS AS NEEDED
Status: DISCONTINUED | OUTPATIENT
Start: 2024-08-07 | End: 2024-08-07

## 2024-08-07 RX ORDER — SODIUM CITRATE AND CITRIC ACID MONOHYDRATE 334; 500 MG/5ML; MG/5ML
30 SOLUTION ORAL ONCE
Status: COMPLETED | OUTPATIENT
Start: 2024-08-07 | End: 2024-08-07

## 2024-08-07 ASSESSMENT — PAIN SCALES - GENERAL
PAINLEVEL_OUTOF10: 0 - NO PAIN
PAINLEVEL_OUTOF10: 9
PAINLEVEL_OUTOF10: 6
PAIN_LEVEL: 7

## 2024-08-07 ASSESSMENT — PAIN - FUNCTIONAL ASSESSMENT
PAIN_FUNCTIONAL_ASSESSMENT: 0-10
PAIN_FUNCTIONAL_ASSESSMENT: 0-10

## 2024-08-07 ASSESSMENT — PAIN DESCRIPTION - LOCATION
LOCATION: ABDOMEN
LOCATION: ABDOMEN

## 2024-08-07 NOTE — ANESTHESIA PROCEDURE NOTES
Airway  Date/Time: 8/7/2024 9:44 AM  Urgency: elective    Airway not difficult    Staffing  Performed: CRNA   Authorized by: NANDO Lovell    Performed by: NANDO Lovell  Patient location during procedure: OR    Indications and Patient Condition  Indications for airway management: anesthesia and airway protection  Spontaneous ventilation: present  Sedation level: deep  Preoxygenated: yes  Patient position: sniffing  MILS maintained throughout    Planned trial extubation    Final Airway Details  Final airway type: endotracheal airway      Successful airway: ETT  Cuffed: yes   Successful intubation technique: direct laryngoscopy  Facilitating devices/methods: intubating stylet  Endotracheal tube insertion site: oral  Blade: Deepika  Blade size: #3  ETT size (mm): 7.5  Cormack-Lehane Classification: grade I - full view of glottis  Placement verified by: chest auscultation and capnometry

## 2024-08-07 NOTE — ANESTHESIA POSTPROCEDURE EVALUATION
Patient: Tonja Castillo    Procedure Summary       Date: 08/07/24 Room / Location: [facility] 01 / [facility]    Anesthesia Start: 0929 Anesthesia Stop: 1118    Procedure: recurrent, 5cm ventral hernia repair epigastric area with mesh *SDS* Diagnosis:       Ventral hernia, recurrent      (Ventral hernia, recurrent [K43.2])    Surgeons: Coral Lockhart MD Responsible Provider: NANDO Lovell    Anesthesia Type: MAC ASA Status: 3            Anesthesia Type: MAC    Vitals Value Taken Time   /77 08/07/24 1119   Temp 98.8 08/07/24 1119   Pulse 77 08/07/24 1119   Resp 16 08/07/24 1119   SpO2 97 08/07/24 1119       Anesthesia Post Evaluation    Patient location during evaluation: PACU  Patient participation: complete - patient participated  Level of consciousness: awake and alert  Pain score: 7  Pain management: adequate  Airway patency: patent  Two or more strategies used to mitigate risk of obstructive sleep apnea  Cardiovascular status: acceptable and hemodynamically stable  Respiratory status: acceptable and room air  Hydration status: acceptable  Postoperative Nausea and Vomiting: none  Comments: PT EXPERIENCES PAIN ONLY DURING TRANSIENT BOUTS OF CRAMPING      No notable events documented.

## 2024-08-07 NOTE — ANESTHESIA PREPROCEDURE EVALUATION
Patient: Tonja Castillo    Procedure Information       Anesthesia Start Date/Time: 08/07/24 0929    Procedure: recurrent, 5cm ventral hernia repair epigastric area *SDS* - 60 min    Location: [facility] 01 / [facility]    Surgeons: Coral Lockhart MD            Relevant Problems   Anesthesia   (+) Delayed emergence from anesthesia      Cardiac   (+) Atypical chest pain   (+) Benign essential hypertension   (+) Chest pain   (+) Hyperlipidemia   (+) Systolic ejection murmur      Pulmonary   (+) Acute exacerbation of moderate persistent extrinsic asthma (HHS-HCC)   (+) Asthma (HHS-HCC)   (+) Exacerbation of asthma (HHS-HCC)   (+) Moderate persistent asthma (HHS-HCC)   (+) Obstructive sleep apnea syndrome in adult      Neuro   (+) Carpal tunnel syndrome, right   (+) Depression   (+) Major depressive disorder in full remission, unspecified whether recurrent (CMS-HCC)   (+) Major depressive disorder, single episode   (+) Sciatica      GI   (+) Colon cancer (Multi)   (+) GERD (gastroesophageal reflux disease)   (+) Malignant neoplasm of ascending colon (Multi)      Liver   (+) Colon cancer (Multi)   (+) Malignant neoplasm of ascending colon (Multi)      Endocrine   (+) Diabetes mellitus type 2 in obese   (+) Hypothyroidism   (+) 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)   (+) Type 2 diabetes mellitus with carpal tunnel syndrome (Multi)      Hematology   (+) Anemia   (+) Iron deficiency anemia      Musculoskeletal   (+) Carpal tunnel syndrome, right   (+) Displacement of lumbar intervertebral disc without myelopathy   (+) Primary localized osteoarthritis of pelvic region and thigh   (+) Primary osteoarthritis of left knee      HEENT   (+) Acute maxillary sinusitis   (+) Chronic sinusitis      ID   (+) URI, acute       Clinical information reviewed:   Tobacco  Allergies  Meds  Problems  Med Hx  Surg Hx   Fam Hx  Soc   Hx        NPO Detail:  NPO/Void Status  Date of Last Liquid: 08/06/24  Time of Last Liquid: 2100  Date of Last  Solid: 08/06/24  Time of Last Solid: 2100  Last Intake Type: Clear fluids; Light meal         Physical Exam    Airway  Mallampati: I  TM distance: >3 FB  Neck ROM: full     Cardiovascular   Rhythm: regular  Rate: normal     Dental    Pulmonary    Abdominal            Anesthesia Plan    History of general anesthesia?: yes  History of complications of general anesthesia?: no    ASA 3     general     intravenous induction   Anesthetic plan and risks discussed with patient.  Use of blood products discussed with patient who.    Plan discussed with CRNA.

## 2024-08-07 NOTE — OP NOTE
recurrent, 5cm ventral hernia repair epigastric area *SDS* Operative Note     Date: 2024  OR Location: [facility]    Name: Tonja Castillo, : 1945, Age: 79 y.o., MRN: [id number], Sex: female    Diagnosis  Pre-op Diagnosis      * Ventral hernia, recurrent [K43.2] Post-op Diagnosis     * Ventral hernia, recurrent [K43.2]     Procedures  recurrent, 5cm ventral hernia repair epigastric area *SDS*  31361 - PA RPR AA HERNIA RECR 3-10 CM [id number]/STRANGULATED      Surgeons      * Coral Lockhart - Primary    Resident/Fellow/Other Assistant:  Surgeons and Role:     * Lou Hassan DO - Resident - Assisting    Procedure Summary  Anesthesia: Anesthesia type not filed in the log.  ASA: ASA status not filed in the log.  Anesthesia Staff: CRNA: KM Lovell-CRNA  Estimated Blood Loss: 2 mL  Intra-op Medications:   Administrations occurring from 1000 to 1135 on 24:   Medication Name Total Dose   BUPivacaine HCl (Marcaine) 0.25 % (2.5 mg/mL) injection 20 mL              Anesthesia Record               Intraprocedure I/O Totals       None           Specimen: No specimens collected     Staff:   Circulator: Abigail  Scrub Person: Martina         Drains and/or Catheters: * None in log *    Tourniquet Times:         Implants:     Findings: 7 cm recurrent ventral hernia     Indications: Tonja Castillo is an 79 y.o. female who is having surgery for Ventral hernia, recurrent [K43.2]. 7 cm     The patient was seen in the preoperative area. The risks, benefits, complications, treatment options, non-operative alternatives, expected recovery and outcomes were discussed with the patient. The possibilities of reaction to medication, pulmonary aspiration, injury to surrounding structures, bleeding, recurrent infection, the need for additional procedures, failure to diagnose a condition, and creating a complication requiring transfusion or operation were discussed with the patient. The patient concurred with the proposed plan,  giving informed consent.  The site of surgery was properly noted/marked if necessary per policy. The patient has been actively warmed in preoperative area. Preoperative antibiotics have been ordered and given within 1 hours of incision. Venous thrombosis prophylaxis have been ordered including bilateral sequential compression devices    Procedure Details: Pt taken to OR placed supine, general anesthesia administered.  Pt endotracheally intubated. Abd prepped and draped in with Hibiclins.  Area localized with 20 ml of 1/4 % plain Marcaine.  Incision made through previous scar. Carried down through scarred skin, subcutaneous tissue, the hernia until the abd was entered.  There was incarcerated omentum adherent to the walls of the hernia that required extensive dissection to free from the peritoneum. The midline defect in the omentum was closed with running suture ensuring no bowel was able to pass through and get incarcerated.  The hernia measured 7 cm in length once it was freed.  The anterior fascia was also delineated with further dissection. 8 cm Ventralex ST mesh was inserted and sutured circumferentially to the fascia with #1 Prolene. The remaining fascia was closed with 0-0 PDS in the midline over the mesh.  Deep dermal sutures were placed and skin closed with 4-0 Vicryl.  Steri strips and dressing applied.  Pt awakened and taken to PACU in stable condition.  Complications:  None; patient tolerated the procedure well.    Disposition: PACU - hemodynamically stable.  Condition: stable         Additional Details: above    Attending Attestation: I was present for the entire procedure.    Coral Lockhart  Phone Number: [phone]

## 2024-09-02 NOTE — PROGRESS NOTES
"Counseling:  The patient was counseled regarding diagnostic results, instructions for management, risk factor reductions, prognosis, patient and family education, impressions, risks and benefits of treatment options and importance of compliance with treatment.      Chief Complaint:   The patient presents today for annual followup of CAD and HTN.     History Of Present Illness:    Tonja Castillo is a 79 year old female patient who presents today for annual followup of CAD and HTN. Her PMH is significant for asthma, HTN, DM type 2, GERD, hyperlipidemia, hypothyroidism, major depressive disorder, DYLON and systolic ejection murmur. Over the past year, the patient states that she has done well from a cardiac standpoint. She denies any CP, chest discomfort or SOB. She reports BLE edema, right greater than left noting that she had a prior injury to her right calf. The patient is compliant with her prescribed medications.      Last Recorded Vitals:  Vitals:    09/03/24 1232   BP: 138/60   BP Location: Left arm   Pulse: 78   SpO2: 100%   Weight: 73 kg (161 lb)   Height: 1.6 m (5' 3\")       Past Surgical History:  She has a past surgical history that includes Other surgical history (12/09/2020); Other surgical history (12/14/2020); Other surgical history (12/14/2020); Other surgical history (04/21/2020); Other surgical history (04/21/2020); Other surgical history (04/21/2020); Other surgical history (04/21/2020); Other surgical history (04/21/2020); Ventral hernia repair (10/11/2023); and Colonoscopy (10/10/2023).      Social History:  She reports that she has never smoked. She has never been exposed to tobacco smoke. She has never used smokeless tobacco. She reports that she does not currently use alcohol. She reports that she does not use drugs.    Family History:  Family History   Problem Relation Name Age of Onset    Hypertension Mother      Coronary artery disease Mother      Diabetes Father      Coronary artery disease " Father      Hypertension Father          Allergies:  Bacitracin, Lisinopril, and Codeine    Outpatient Medications:  Current Outpatient Medications   Medication Instructions    acetaminophen (Tylenol) 500 mg tablet oral, Every 6 hours PRN    albuterol 90 mcg/actuation inhaler inhalation, Every 6 hours    amLODIPine (NORVASC) 10 mg, oral, Daily    amLODIPine (NORVASC) 10 mg, oral, Daily    atorvastatin (LIPITOR) 40 mg, oral, Nightly    azelastine (Astelin) 137 mcg (0.1 %) nasal spray 1 spray, Each Nostril, 2 times daily, Use in each nostril as directed    buPROPion SR (WELLBUTRIN SR) 150 mg, oral, Every 12 hours    cholecalciferol (Vitamin D-3) 50 mcg (2,000 unit) capsule oral    cinnamon 2,000 mg, oral, 2 times daily    cyanocobalamin (VITAMIN B-12) 100 mcg, oral, 3 times weekly    ferrous sulfate 325 (65 Fe) MG tablet 1 tablet, oral, Daily    fexofenadine (Allegra) 180 mg tablet 1 tablet, oral, Daily    fluticasone (Flonase) 50 mcg/actuation nasal spray 2 sprays, Each Nostril, Daily, Shake gently. Before first use, prime pump. After use, clean tip and replace cap.    gabapentin (NEURONTIN) 300 mg, oral, Nightly    hydrocortisone 2.5 % cream Topical, 2 times daily PRN    Klor-Con M10 10 mEq ER tablet 10 mEq, oral, Daily    levothyroxine (SYNTHROID, LEVOXYL) 112 mcg, oral, Daily, Take on an empty stomach at the same time each day, either 30 to 60 minutes prior to breakfast    magnesium oxide (Mag-Ox) 400 mg (241.3 mg magnesium) tablet TAKE 1 TABLET BY MOUTH DAILY    metFORMIN (GLUCOPHAGE) 850 mg, oral, 2 times daily (morning and late afternoon)    montelukast (SINGULAIR) 10 mg, oral, Daily    omeprazole (PriLOSEC) 20 mg DR capsule oral, Daily RT    oxyCODONE (ROXICODONE) 5 mg, oral, Every 6 hours PRN    vit A,C and E-lutein-minerals (Ocuvite with Lutein) 300 mcg-200 mg-27 mg-2 mg tablet 1 tablet, oral, Daily    Wixela Inhub 250-50 mcg/dose diskus inhaler 1 puff, inhalation, Daily, Rinse mouth with water after use to  reduce aftertaste and incidence of candidiasis. Do not swallow.     Review of Systems   Cardiovascular:  Positive for leg swelling.   All other systems reviewed and are negative.     Physical Exam:  Constitutional:       Appearance: Healthy appearance. Not in distress.   Neck:      Vascular: No JVR. JVD normal.   Pulmonary:      Effort: Pulmonary effort is normal.      Breath sounds: Normal breath sounds. No wheezing. No rhonchi. No rales.   Chest:      Chest wall: Not tender to palpatation.   Cardiovascular:      PMI at left midclavicular line. Normal rate. Regular rhythm. Normal S1. Normal S2.       Murmurs: There is a grade 2/6 systolic murmur at the URSB.      No gallop.  No click. No rub.   Pulses:     Intact distal pulses.   Edema:     Peripheral edema absent.   Abdominal:      General: Bowel sounds are normal.      Palpations: Abdomen is soft.      Tenderness: There is no abdominal tenderness.   Musculoskeletal: Normal range of motion.         General: No tenderness. Skin:     General: Skin is warm and dry.   Neurological:      General: No focal deficit present.      Mental Status: Alert and oriented to person, place and time.          Last Labs:  CBC -  Lab Results   Component Value Date    WBC 6.4 07/26/2024    HGB 11.9 (L) 07/26/2024    HCT 36.7 07/26/2024    MCV 92 07/26/2024     07/26/2024       CMP -  Lab Results   Component Value Date    CALCIUM 9.2 07/26/2024    PROT 6.7 05/24/2024    ALBUMIN 4.1 05/24/2024    AST 21 05/24/2024    ALT 24 05/24/2024    ALKPHOS 69 05/24/2024    BILITOT 0.6 05/24/2024       LIPID PANEL -   Lab Results   Component Value Date    CHOL 162 05/24/2024    TRIG 84 05/24/2024    HDL 83.4 05/24/2024    CHHDL 1.9 05/24/2024    LDLF 52 05/05/2023    VLDL 17 05/24/2024    NHDL 79 05/24/2024       RENAL FUNCTION PANEL -   Lab Results   Component Value Date    GLUCOSE 154 (H) 07/26/2024     07/26/2024    K 3.5 07/26/2024     07/26/2024    CO2 26 07/26/2024     ANIONGAP 9 (L) 07/26/2024    BUN 16 07/26/2024    CREATININE 0.80 07/26/2024    CALCIUM 9.2 07/26/2024    ALBUMIN 4.1 05/24/2024        Lab Results   Component Value Date    HGBA1C 5.8 (H) 05/24/2024       Last Cardiology Tests:  05/28/2021 - TTE  The left ventricular systolic function is normal with a 55-60% estimated ejection fraction.     11/09/2020 - U/S Duplex Lower Extremity Veins, Bilateral   1. No sonographic evidence for deep vein thrombosis within the evaluated veins of the bilateral lower extremities.  2. Left popliteal fossa fluid collection, likely representing a Baker's cyst.     05/30/2019 - Cardiac Catheterization (LH)  1. Mild non-obstructive coronary artery disease.  2. Left Ventricular end-diastolic pressure = 16.  3. Normal LV filling pressures.      05/17/2019 - Lower Extremity Venous Duplex   No evidence of right lower extremity deep venous thrombosis.      02/03/2017 - Echocardiogram   1. Normal left ventricular size, wall thicknesses, and regional systolic function with an ejection fraction of 60%.   2. Normal right ventricular size and systolic function.     Lab review: I have personally reviewed the laboratory result(s).    Assessment/Plan   1) Mild CAD  On ASA 81 mg daily, amlodipine 10 mg daily, atorvastatin 40 mg daily,   Aggressive secondary risk factor modification  Lipid panel 05/24/2024 with LDL of 62  Denies CP, chest discomfort or SOB  Reports BLE edema, right greater than left - prior injury to right calf, per patient - likely s/t CVI  Recommend compression stockings   BP stable  Continue current medical Rx   Repeat echo 1 year  F/U 1 year     2) HTN  Stable   On amlodipine 10 mg daily  Continue current medical Rx   F/U 1 year        Scribe Attestation  By signing my name below, I, Sacha Zeng   attest that this documentation has been prepared under the direction and in the presence of Adiel Swift MD.

## 2024-09-03 ENCOUNTER — OFFICE VISIT (OUTPATIENT)
Dept: CARDIOLOGY | Facility: HOSPITAL | Age: 79
End: 2024-09-03
Payer: MEDICARE

## 2024-09-03 VITALS
HEART RATE: 78 BPM | OXYGEN SATURATION: 100 % | WEIGHT: 161 LBS | BODY MASS INDEX: 28.53 KG/M2 | SYSTOLIC BLOOD PRESSURE: 138 MMHG | DIASTOLIC BLOOD PRESSURE: 60 MMHG | HEIGHT: 63 IN

## 2024-09-03 DIAGNOSIS — I10 BENIGN ESSENTIAL HYPERTENSION: Primary | ICD-10-CM

## 2024-09-03 DIAGNOSIS — R01.1 SYSTOLIC EJECTION MURMUR: ICD-10-CM

## 2024-09-03 PROCEDURE — 99213 OFFICE O/P EST LOW 20 MIN: CPT | Performed by: INTERNAL MEDICINE

## 2024-09-03 PROCEDURE — 1036F TOBACCO NON-USER: CPT | Performed by: INTERNAL MEDICINE

## 2024-09-03 PROCEDURE — 3078F DIAST BP <80 MM HG: CPT | Performed by: INTERNAL MEDICINE

## 2024-09-03 PROCEDURE — 3075F SYST BP GE 130 - 139MM HG: CPT | Performed by: INTERNAL MEDICINE

## 2024-09-03 PROCEDURE — 1157F ADVNC CARE PLAN IN RCRD: CPT | Performed by: INTERNAL MEDICINE

## 2024-09-03 PROCEDURE — 1159F MED LIST DOCD IN RCRD: CPT | Performed by: INTERNAL MEDICINE

## 2024-09-03 PROCEDURE — 1111F DSCHRG MED/CURRENT MED MERGE: CPT | Performed by: INTERNAL MEDICINE

## 2024-09-03 PROCEDURE — 1160F RVW MEDS BY RX/DR IN RCRD: CPT | Performed by: INTERNAL MEDICINE

## 2024-09-03 ASSESSMENT — ENCOUNTER SYMPTOMS
OCCASIONAL FEELINGS OF UNSTEADINESS: 0
LOSS OF SENSATION IN FEET: 1
DEPRESSION: 1

## 2024-09-03 NOTE — LETTER
"September 3, 2024     Melo Benjamin MD  [address]  [address]  [facility] [address]    Patient: Tonja Castillo   YOB: 1945   Date of Visit: 9/3/2024       Dear Dr. Melo Benjamin MD:    Thank you for referring Tonja Castillo to me for evaluation. Below are my notes for this consultation.  If you have questions, please do not hesitate to call me. I look forward to following your patient along with you.       Sincerely,     Adiel Swift MD      CC: No Recipients  ______________________________________________________________________________________    Counseling:  The patient was counseled regarding diagnostic results, instructions for management, risk factor reductions, prognosis, patient and family education, impressions, risks and benefits of treatment options and importance of compliance with treatment.      Chief Complaint:   The patient presents today for annual followup of CAD and HTN.     History Of Present Illness:    Tonja Castillo is a 79 year old female patient who presents today for annual followup of CAD and HTN. Her PMH is significant for asthma, HTN, DM type 2, GERD, hyperlipidemia, hypothyroidism, major depressive disorder, DYLON and systolic ejection murmur. Over the past year, the patient states that she has done well from a cardiac standpoint. She denies any CP, chest discomfort or SOB. She reports BLE edema, right greater than left noting that she had a prior injury to her right calf. The patient is compliant with her prescribed medications.      Last Recorded Vitals:  Vitals:    09/03/24 1232   BP: 138/60   BP Location: Left arm   Pulse: 78   SpO2: 100%   Weight: 73 kg (161 lb)   Height: 1.6 m (5' 3\")       Past Surgical History:  She has a past surgical history that includes Other surgical history (12/09/2020); Other surgical history (12/14/2020); Other surgical history (12/14/2020); Other surgical history (04/21/2020); Other surgical history (04/21/2020); Other surgical history " (04/21/2020); Other surgical history (04/21/2020); Other surgical history (04/21/2020); Ventral hernia repair (10/11/2023); and Colonoscopy (10/10/2023).      Social History:  She reports that she has never smoked. She has never been exposed to tobacco smoke. She has never used smokeless tobacco. She reports that she does not currently use alcohol. She reports that she does not use drugs.    Family History:  Family History   Problem Relation Name Age of Onset   • Hypertension Mother     • Coronary artery disease Mother     • Diabetes Father     • Coronary artery disease Father     • Hypertension Father          Allergies:  Bacitracin, Lisinopril, and Codeine    Outpatient Medications:  Current Outpatient Medications   Medication Instructions   • acetaminophen (Tylenol) 500 mg tablet oral, Every 6 hours PRN   • albuterol 90 mcg/actuation inhaler inhalation, Every 6 hours   • amLODIPine (NORVASC) 10 mg, oral, Daily   • amLODIPine (NORVASC) 10 mg, oral, Daily   • atorvastatin (LIPITOR) 40 mg, oral, Nightly   • azelastine (Astelin) 137 mcg (0.1 %) nasal spray 1 spray, Each Nostril, 2 times daily, Use in each nostril as directed   • buPROPion SR (WELLBUTRIN SR) 150 mg, oral, Every 12 hours   • cholecalciferol (Vitamin D-3) 50 mcg (2,000 unit) capsule oral   • cinnamon 2,000 mg, oral, 2 times daily   • cyanocobalamin (VITAMIN B-12) 100 mcg, oral, 3 times weekly   • ferrous sulfate 325 (65 Fe) MG tablet 1 tablet, oral, Daily   • fexofenadine (Allegra) 180 mg tablet 1 tablet, oral, Daily   • fluticasone (Flonase) 50 mcg/actuation nasal spray 2 sprays, Each Nostril, Daily, Shake gently. Before first use, prime pump. After use, clean tip and replace cap.   • gabapentin (NEURONTIN) 300 mg, oral, Nightly   • hydrocortisone 2.5 % cream Topical, 2 times daily PRN   • Klor-Con M10 10 mEq ER tablet 10 mEq, oral, Daily   • levothyroxine (SYNTHROID, LEVOXYL) 112 mcg, oral, Daily, Take on an empty stomach at the same time each day,  either 30 to 60 minutes prior to breakfast   • magnesium oxide (Mag-Ox) 400 mg (241.3 mg magnesium) tablet TAKE 1 TABLET BY MOUTH DAILY   • metFORMIN (GLUCOPHAGE) 850 mg, oral, 2 times daily (morning and late afternoon)   • montelukast (SINGULAIR) 10 mg, oral, Daily   • omeprazole (PriLOSEC) 20 mg DR capsule oral, Daily RT   • oxyCODONE (ROXICODONE) 5 mg, oral, Every 6 hours PRN   • vit A,C and E-lutein-minerals (Ocuvite with Lutein) 300 mcg-200 mg-27 mg-2 mg tablet 1 tablet, oral, Daily   • Wixela Inhub 250-50 mcg/dose diskus inhaler 1 puff, inhalation, Daily, Rinse mouth with water after use to reduce aftertaste and incidence of candidiasis. Do not swallow.     Review of Systems   Cardiovascular:  Positive for leg swelling.   All other systems reviewed and are negative.     Physical Exam:  Constitutional:       Appearance: Healthy appearance. Not in distress.   Neck:      Vascular: No JVR. JVD normal.   Pulmonary:      Effort: Pulmonary effort is normal.      Breath sounds: Normal breath sounds. No wheezing. No rhonchi. No rales.   Chest:      Chest wall: Not tender to palpatation.   Cardiovascular:      PMI at left midclavicular line. Normal rate. Regular rhythm. Normal S1. Normal S2.       Murmurs: There is a grade 2/6 systolic murmur at the URSB.      No gallop.  No click. No rub.   Pulses:     Intact distal pulses.   Edema:     Peripheral edema absent.   Abdominal:      General: Bowel sounds are normal.      Palpations: Abdomen is soft.      Tenderness: There is no abdominal tenderness.   Musculoskeletal: Normal range of motion.         General: No tenderness. Skin:     General: Skin is warm and dry.   Neurological:      General: No focal deficit present.      Mental Status: Alert and oriented to person, place and time.          Last Labs:  CBC -  Lab Results   Component Value Date    WBC 6.4 07/26/2024    HGB 11.9 (L) 07/26/2024    HCT 36.7 07/26/2024    MCV 92 07/26/2024     07/26/2024       CMP  -  Lab Results   Component Value Date    CALCIUM 9.2 07/26/2024    PROT 6.7 05/24/2024    ALBUMIN 4.1 05/24/2024    AST 21 05/24/2024    ALT 24 05/24/2024    ALKPHOS 69 05/24/2024    BILITOT 0.6 05/24/2024       LIPID PANEL -   Lab Results   Component Value Date    CHOL 162 05/24/2024    TRIG 84 05/24/2024    HDL 83.4 05/24/2024    CHHDL 1.9 05/24/2024    LDLF 52 05/05/2023    VLDL 17 05/24/2024    NHDL 79 05/24/2024       RENAL FUNCTION PANEL -   Lab Results   Component Value Date    GLUCOSE 154 (H) 07/26/2024     07/26/2024    K 3.5 07/26/2024     07/26/2024    CO2 26 07/26/2024    ANIONGAP 9 (L) 07/26/2024    BUN 16 07/26/2024    CREATININE 0.80 07/26/2024    CALCIUM 9.2 07/26/2024    ALBUMIN 4.1 05/24/2024        Lab Results   Component Value Date    HGBA1C 5.8 (H) 05/24/2024       Last Cardiology Tests:  05/28/2021 - TTE  The left ventricular systolic function is normal with a 55-60% estimated ejection fraction.     11/09/2020 - U/S Duplex Lower Extremity Veins, Bilateral   1. No sonographic evidence for deep vein thrombosis within the evaluated veins of the bilateral lower extremities.  2. Left popliteal fossa fluid collection, likely representing a Baker's cyst.     05/30/2019 - Cardiac Catheterization (LH)  1. Mild non-obstructive coronary artery disease.  2. Left Ventricular end-diastolic pressure = 16.  3. Normal LV filling pressures.      05/17/2019 - Lower Extremity Venous Duplex   No evidence of right lower extremity deep venous thrombosis.      02/03/2017 - Echocardiogram   1. Normal left ventricular size, wall thicknesses, and regional systolic function with an ejection fraction of 60%.   2. Normal right ventricular size and systolic function.     Lab review: I have personally reviewed the laboratory result(s).    Assessment/Plan  1) Mild CAD  On ASA 81 mg daily, amlodipine 10 mg daily, atorvastatin 40 mg daily,   Aggressive secondary risk factor modification  Lipid panel 05/24/2024 with  LDL of 62  Denies CP, chest discomfort or SOB  Reports BLE edema, right greater than left - prior injury to right calf, per patient - likely s/t CVI  Recommend compression stockings   BP stable  Continue current medical Rx   Repeat echo 1 year  F/U 1 year     2) HTN  Stable   On amlodipine 10 mg daily  Continue current medical Rx   F/U 1 year        Scribe Attestation  By signing my name below, I, Sacha Zeng   attest that this documentation has been prepared under the direction and in the presence of Adiel Swift MD.

## 2024-09-03 NOTE — PATIENT INSTRUCTIONS
Continue all current medications as prescribed.  For the swelling in your legs, Dr. Swift has recommended wearing compression stockings throughout the day; put on first thing in the morning, take off at night before bed.  Repeat echocardiogram (ultrasound of the heart) in 1 year to followup on your heart function and structure.   Followup with Dr. Swift in 1 year, sooner should any issues or concerns arise before then.     If you have any questions or cardiac concerns, please call our office at [phone].

## 2024-09-16 ENCOUNTER — APPOINTMENT (OUTPATIENT)
Dept: OBSTETRICS AND GYNECOLOGY | Facility: CLINIC | Age: 79
End: 2024-09-16
Payer: MEDICARE

## 2024-09-16 VITALS
DIASTOLIC BLOOD PRESSURE: 84 MMHG | WEIGHT: 163.2 LBS | BODY MASS INDEX: 27.86 KG/M2 | SYSTOLIC BLOOD PRESSURE: 144 MMHG | HEIGHT: 64 IN

## 2024-09-16 DIAGNOSIS — N76.4 VULVAR ABSCESS: Primary | ICD-10-CM

## 2024-09-16 PROCEDURE — 3077F SYST BP >= 140 MM HG: CPT | Performed by: OBSTETRICS & GYNECOLOGY

## 2024-09-16 PROCEDURE — 1157F ADVNC CARE PLAN IN RCRD: CPT | Performed by: OBSTETRICS & GYNECOLOGY

## 2024-09-16 PROCEDURE — 1036F TOBACCO NON-USER: CPT | Performed by: OBSTETRICS & GYNECOLOGY

## 2024-09-16 PROCEDURE — 3079F DIAST BP 80-89 MM HG: CPT | Performed by: OBSTETRICS & GYNECOLOGY

## 2024-09-16 PROCEDURE — 1159F MED LIST DOCD IN RCRD: CPT | Performed by: OBSTETRICS & GYNECOLOGY

## 2024-09-16 PROCEDURE — 99213 OFFICE O/P EST LOW 20 MIN: CPT | Performed by: OBSTETRICS & GYNECOLOGY

## 2024-09-16 NOTE — PROGRESS NOTES
Subjective   Patient ID: Tonja Castillo is a 79 y.o. female who presents for No chief complaint on file..  HPI 79 years old with a history of Bartholin cyst is here for follow-up.  She states that she recently had a second umbilical hernia repair done and still recovering.  She reports no recurrence of cyst in the vulvar area.  She denies any postmenopausal bleeding.    Review of Systems   All other systems reviewed and are negative.      Objective   Physical Exam  Constitutional:       Appearance: Normal appearance.   Pulmonary:      Effort: Pulmonary effort is normal.   Genitourinary:     General: Normal vulva.      Vagina: Normal.      Cervix: Normal.      Uterus: Normal.       Adnexa: Right adnexa normal and left adnexa normal.      Comments: No cyst no mass  Neurological:      Mental Status: She is alert.   Psychiatric:         Mood and Affect: Mood normal.         Assessment/Plan   Problem List Items Addressed This Visit    None  Visit Diagnoses         Codes    Vulvar abscess    -  Primary N76.4        Resolved no recurrences.  Follow-up as needed  or every 2 years for checkup.         Sylvester Edmondson MD 09/16/24 2:05 PM

## 2024-09-20 ENCOUNTER — APPOINTMENT (OUTPATIENT)
Dept: ORTHOPEDIC SURGERY | Facility: CLINIC | Age: 79
End: 2024-09-20
Payer: MEDICARE

## 2024-09-20 ENCOUNTER — HOSPITAL ENCOUNTER (OUTPATIENT)
Dept: RADIOLOGY | Facility: CLINIC | Age: 79
Discharge: HOME | End: 2024-09-20
Payer: MEDICARE

## 2024-09-20 VITALS — HEIGHT: 64 IN | BODY MASS INDEX: 27.83 KG/M2 | WEIGHT: 163 LBS

## 2024-09-20 DIAGNOSIS — M79.669 CALF PAIN, UNSPECIFIED LATERALITY: ICD-10-CM

## 2024-09-20 DIAGNOSIS — S82.831A: Primary | ICD-10-CM

## 2024-09-20 PROCEDURE — 1157F ADVNC CARE PLAN IN RCRD: CPT | Performed by: EMERGENCY MEDICINE

## 2024-09-20 PROCEDURE — 99204 OFFICE O/P NEW MOD 45 MIN: CPT | Performed by: EMERGENCY MEDICINE

## 2024-09-20 PROCEDURE — 73590 X-RAY EXAM OF LOWER LEG: CPT | Mod: RT

## 2024-09-20 PROCEDURE — 1036F TOBACCO NON-USER: CPT | Performed by: EMERGENCY MEDICINE

## 2024-09-20 PROCEDURE — 1159F MED LIST DOCD IN RCRD: CPT | Performed by: EMERGENCY MEDICINE

## 2024-09-20 NOTE — PROGRESS NOTES
Subjective    Patient ID: Tonja Castillo is a 79 y.o. female.    Chief Complaint: Pain of the Right Lower Leg (Referred by  XR done today R/O cakf muscle/Tendon tear Patient states in 2022 she fell in the bathroom injury the leg she had a flare up again about a year later and has noticed some swelling no pain )     Last Surgery: No surgery found  Last Surgery Date: No surgery found    Tonja is a 79-year-old female coming in with right lateral calf discomfort that has been acute on chronic and began 2 years ago when she fell.  She was in the bathroom and fell onto her right lower extremity hitting essentially her fibular head.  She had swelling and bruising and was seen by another provider at that time who ordered a duplex ultrasound which ruled out a DVT.  She has not had imaging until today.  She was recently seen by Dr. Persaud, who referred her here for evaluation and management.  She states that she has symptoms intermittently but today is only complaining of some mild swelling over the fibular head area and the lateral gastroc but overall has no change in range of motion, strength, and has no pain.        Objective   Right Knee Exam     Muscle Strength   The patient has normal right knee strength.    Tenderness   The patient is experiencing no tenderness (No tenderness to palpation over the proximal fibular head).     Range of Motion   The patient has normal right knee ROM.  Extension:  normal   Flexion:  normal     Tests   Jessica:  Medial - negative Lateral - negative  Varus: negative Valgus: negative    Other   Erythema: absent  Sensation: normal  Pulse: present  Swelling: none  Effusion: no effusion present    Comments:  Maybe some mild asymmetry with swelling of the right lateral gastroc.  Santos squeeze testing is reassuring and the Achilles tendon is intact.  Dorsiflexion strength testing is fully intact and nonpainful.  She has no tenderness palpation over the proximal fibular  head.      Left Knee Exam     Muscle Strength   The patient has normal left knee strength.            Image Results:  X-rays of the right tibia and fibula were reviewed and interpreted by me on 9/20/2024 and show evidence of a remote/healed proximal fibular neck fracture.  No evidence of acute injuries or fractures.    Assessment/Plan   Encounter Diagnoses:  Traumatic closed nondisplaced fracture of proximal end of right fibula, initial encounter    Calf pain, unspecified laterality    Orders Placed This Encounter    XR tibia fibula right 2 views     No follow-ups on file.    We had a long discussion about her treatment options and I did say that she could potentially do Voltaren or Aspercreme at prescription dosing 3 times daily for the next couple of weeks to try to get some of her swelling down but since she is essentially asymptomatic with normal strength and range of motion there is no other intervention indicated at this time.  This was likely a remote injury that she sustained 2 years ago when she fell and she has some residual swelling.  If she injured her muscle it has essentially healed and she is asymptomatic here today.  No indication for additional imaging or surgical referral.  She will follow-up with me as needed moving forward.    ** Please excuse any errors in grammar or translation related to this dictation. Voice recognition software was utilized to prepare this document. **       Lamont Coffman MD  [facility]

## 2024-09-30 DIAGNOSIS — F32.A DEPRESSION, UNSPECIFIED DEPRESSION TYPE: ICD-10-CM

## 2024-09-30 DIAGNOSIS — M70.61 TROCHANTERIC BURSITIS OF RIGHT HIP: ICD-10-CM

## 2024-09-30 DIAGNOSIS — E78.5 HYPERLIPIDEMIA, UNSPECIFIED HYPERLIPIDEMIA TYPE: ICD-10-CM

## 2024-09-30 RX ORDER — ATORVASTATIN CALCIUM 40 MG/1
40 TABLET, FILM COATED ORAL NIGHTLY
Qty: 90 TABLET | Refills: 1 | Status: SHIPPED | OUTPATIENT
Start: 2024-09-30

## 2024-09-30 RX ORDER — GABAPENTIN 300 MG/1
300 CAPSULE ORAL NIGHTLY
Qty: 90 CAPSULE | Refills: 1 | Status: SHIPPED | OUTPATIENT
Start: 2024-09-30 | End: 2025-03-29

## 2024-09-30 RX ORDER — BUPROPION HYDROCHLORIDE 150 MG/1
150 TABLET, EXTENDED RELEASE ORAL EVERY 12 HOURS
Qty: 180 TABLET | Refills: 1 | Status: SHIPPED | OUTPATIENT
Start: 2024-09-30

## 2024-10-11 DIAGNOSIS — E83.42 HYPOMAGNESEMIA: ICD-10-CM

## 2024-10-11 RX ORDER — LANOLIN ALCOHOL/MO/W.PET/CERES
CREAM (GRAM) TOPICAL
Qty: 90 TABLET | Refills: 1 | Status: SHIPPED | OUTPATIENT
Start: 2024-10-11

## 2024-10-22 DIAGNOSIS — E03.9 ACQUIRED HYPOTHYROIDISM: ICD-10-CM

## 2024-10-22 RX ORDER — LEVOTHYROXINE SODIUM 112 UG/1
TABLET ORAL
Qty: 90 TABLET | Refills: 1 | Status: SHIPPED | OUTPATIENT
Start: 2024-10-22

## 2024-11-25 ENCOUNTER — LAB (OUTPATIENT)
Dept: LAB | Facility: LAB | Age: 79
End: 2024-11-25
Payer: MEDICARE

## 2024-11-25 DIAGNOSIS — R22.41 LOCALIZED SWELLING OF RIGHT LOWER LEG: ICD-10-CM

## 2024-11-25 DIAGNOSIS — C18.9 MALIGNANT NEOPLASM OF COLON, UNSPECIFIED PART OF COLON (MULTI): ICD-10-CM

## 2024-11-25 DIAGNOSIS — E55.9 VITAMIN D DEFICIENCY: ICD-10-CM

## 2024-11-25 DIAGNOSIS — E66.9 TYPE 2 DIABETES MELLITUS WITH OBESITY (MULTI): ICD-10-CM
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
## 2024-11-25 DIAGNOSIS — E03.9 ACQUIRED HYPOTHYROIDISM: ICD-10-CM

## 2024-11-25 DIAGNOSIS — F32.5 MAJOR DEPRESSIVE DISORDER IN FULL REMISSION, UNSPECIFIED WHETHER RECURRENT (CMS-HCC): ICD-10-CM

## 2024-11-25 DIAGNOSIS — E11.9 TYPE 2 DIABETES MELLITUS WITHOUT COMPLICATION, WITHOUT LONG-TERM CURRENT USE OF INSULIN (MULTI): ICD-10-CM

## 2024-11-25 DIAGNOSIS — I10 BENIGN ESSENTIAL HYPERTENSION: ICD-10-CM

## 2024-11-25 DIAGNOSIS — Z89.421 ACQUIRED ABSENCE OF OTHER RIGHT TOE(S) (MULTI): ICD-10-CM

## 2024-11-25 DIAGNOSIS — E11.69 TYPE 2 DIABETES MELLITUS WITH OBESITY (MULTI): ICD-10-CM

## 2024-11-25 DIAGNOSIS — K43.9 VENTRAL HERNIA WITHOUT OBSTRUCTION OR GANGRENE: ICD-10-CM

## 2024-11-25 LAB
25(OH)D3 SERPL-MCNC: 74 NG/ML (ref 30–100)
ALBUMIN SERPL BCP-MCNC: 4.1 G/DL (ref 3.4–5)
ALP SERPL-CCNC: 74 U/L (ref 33–136)
ALT SERPL W P-5'-P-CCNC: 11 U/L (ref 7–45)
ANION GAP SERPL CALC-SCNC: 10 MMOL/L (ref 10–20)
AST SERPL W P-5'-P-CCNC: 15 U/L (ref 9–39)
BASOPHILS # BLD AUTO: 0.07 X10*3/UL (ref 0–0.1)
BASOPHILS NFR BLD AUTO: 0.8 %
BILIRUB SERPL-MCNC: 0.5 MG/DL (ref 0–1.2)
BUN SERPL-MCNC: 15 MG/DL (ref 6–23)
CALCIUM SERPL-MCNC: 9.6 MG/DL (ref 8.6–10.3)
CHLORIDE SERPL-SCNC: 106 MMOL/L (ref 98–107)
CHOLEST SERPL-MCNC: 156 MG/DL (ref 0–199)
CHOLESTEROL/HDL RATIO: 2.2
CO2 SERPL-SCNC: 31 MMOL/L (ref 21–32)
CREAT SERPL-MCNC: 0.84 MG/DL (ref 0.5–1.05)
CREAT UR-MCNC: 81.6 MG/DL (ref 20–320)
EGFRCR SERPLBLD CKD-EPI 2021: 71 ML/MIN/1.73M*2
EOSINOPHIL # BLD AUTO: 0.32 X10*3/UL (ref 0–0.4)
EOSINOPHIL NFR BLD AUTO: 3.9 %
ERYTHROCYTE [DISTWIDTH] IN BLOOD BY AUTOMATED COUNT: 13.5 % (ref 11.5–14.5)
GLUCOSE SERPL-MCNC: 99 MG/DL (ref 74–99)
HCT VFR BLD AUTO: 42 % (ref 36–46)
HDLC SERPL-MCNC: 70.7 MG/DL
HGB BLD-MCNC: 13 G/DL (ref 12–16)
IMM GRANULOCYTES # BLD AUTO: 0.02 X10*3/UL (ref 0–0.5)
IMM GRANULOCYTES NFR BLD AUTO: 0.2 % (ref 0–0.9)
LDLC SERPL CALC-MCNC: 64 MG/DL
LYMPHOCYTES # BLD AUTO: 3.41 X10*3/UL (ref 0.8–3)
LYMPHOCYTES NFR BLD AUTO: 41 %
MCH RBC QN AUTO: 29.3 PG (ref 26–34)
MCHC RBC AUTO-ENTMCNC: 31 G/DL (ref 32–36)
MCV RBC AUTO: 95 FL (ref 80–100)
MICROALBUMIN UR-MCNC: <7 MG/L
MICROALBUMIN/CREAT UR: NORMAL MG/G{CREAT}
MONOCYTES # BLD AUTO: 0.54 X10*3/UL (ref 0.05–0.8)
MONOCYTES NFR BLD AUTO: 6.5 %
NEUTROPHILS # BLD AUTO: 3.95 X10*3/UL (ref 1.6–5.5)
NEUTROPHILS NFR BLD AUTO: 47.6 %
NON HDL CHOLESTEROL: 85 MG/DL (ref 0–149)
NRBC BLD-RTO: 0 /100 WBCS (ref 0–0)
PLATELET # BLD AUTO: 332 X10*3/UL (ref 150–450)
POTASSIUM SERPL-SCNC: 3.9 MMOL/L (ref 3.5–5.3)
PROT SERPL-MCNC: 6.5 G/DL (ref 6.4–8.2)
RBC # BLD AUTO: 4.43 X10*6/UL (ref 4–5.2)
SODIUM SERPL-SCNC: 143 MMOL/L (ref 136–145)
T4 FREE SERPL-MCNC: 0.93 NG/DL (ref 0.61–1.12)
TRIGL SERPL-MCNC: 107 MG/DL (ref 0–149)
TSH SERPL-ACNC: 4.05 MIU/L (ref 0.44–3.98)
VLDL: 21 MG/DL (ref 0–40)
WBC # BLD AUTO: 8.3 X10*3/UL (ref 4.4–11.3)

## 2024-11-25 PROCEDURE — 80053 COMPREHEN METABOLIC PANEL: CPT

## 2024-11-25 PROCEDURE — 84439 ASSAY OF FREE THYROXINE: CPT

## 2024-11-25 PROCEDURE — 36415 COLL VENOUS BLD VENIPUNCTURE: CPT

## 2024-11-25 PROCEDURE — 82570 ASSAY OF URINE CREATININE: CPT

## 2024-11-25 PROCEDURE — 82043 UR ALBUMIN QUANTITATIVE: CPT

## 2024-11-25 PROCEDURE — 80061 LIPID PANEL: CPT

## 2024-11-25 PROCEDURE — 85025 COMPLETE CBC W/AUTO DIFF WBC: CPT

## 2024-11-25 PROCEDURE — 83036 HEMOGLOBIN GLYCOSYLATED A1C: CPT

## 2024-11-25 PROCEDURE — 84443 ASSAY THYROID STIM HORMONE: CPT

## 2024-11-25 PROCEDURE — 82306 VITAMIN D 25 HYDROXY: CPT

## 2024-11-26 LAB
EST. AVERAGE GLUCOSE BLD GHB EST-MCNC: 114 MG/DL
HBA1C MFR BLD: 5.6 %

## 2024-12-05 ENCOUNTER — APPOINTMENT (OUTPATIENT)
Dept: PRIMARY CARE | Facility: CLINIC | Age: 79
End: 2024-12-05
Payer: MEDICARE

## 2024-12-05 VITALS
SYSTOLIC BLOOD PRESSURE: 145 MMHG | RESPIRATION RATE: 16 BRPM | DIASTOLIC BLOOD PRESSURE: 80 MMHG | BODY MASS INDEX: 27.72 KG/M2 | OXYGEN SATURATION: 99 % | WEIGHT: 162.4 LBS | HEART RATE: 69 BPM | HEIGHT: 64 IN | TEMPERATURE: 97.8 F

## 2024-12-05 DIAGNOSIS — J45.40 MODERATE PERSISTENT ASTHMA WITHOUT COMPLICATION (HHS-HCC): ICD-10-CM

## 2024-12-05 DIAGNOSIS — Z00.00 ROUTINE GENERAL MEDICAL EXAMINATION AT HEALTH CARE FACILITY: Primary | ICD-10-CM

## 2024-12-05 DIAGNOSIS — Z85.038 HISTORY OF COLON CANCER: ICD-10-CM

## 2024-12-05 DIAGNOSIS — E66.9 TYPE 2 DIABETES MELLITUS WITH OBESITY (MULTI): ICD-10-CM

## 2024-12-05 DIAGNOSIS — M70.61 TROCHANTERIC BURSITIS OF RIGHT HIP: ICD-10-CM

## 2024-12-05 DIAGNOSIS — E11.69 TYPE 2 DIABETES MELLITUS WITH OBESITY (MULTI): ICD-10-CM

## 2024-12-05 DIAGNOSIS — E03.8 SUBCLINICAL HYPOTHYROIDISM: ICD-10-CM

## 2024-12-05 DIAGNOSIS — I25.10 MILD CAD: ICD-10-CM

## 2024-12-05 DIAGNOSIS — E53.8 VITAMIN B12 DEFICIENCY: ICD-10-CM

## 2024-12-05 DIAGNOSIS — C18.9 MALIGNANT NEOPLASM OF COLON, UNSPECIFIED PART OF COLON (MULTI): ICD-10-CM

## 2024-12-05 DIAGNOSIS — F32.5 MAJOR DEPRESSIVE DISORDER IN FULL REMISSION, UNSPECIFIED WHETHER RECURRENT (CMS-HCC): ICD-10-CM

## 2024-12-05 DIAGNOSIS — E55.9 VITAMIN D DEFICIENCY: ICD-10-CM

## 2024-12-05 DIAGNOSIS — Z23 NEED FOR TDAP VACCINATION: ICD-10-CM

## 2024-12-05 DIAGNOSIS — I10 BENIGN ESSENTIAL HYPERTENSION: ICD-10-CM

## 2024-12-05 DIAGNOSIS — R73.03 PREDIABETES: ICD-10-CM

## 2024-12-05 DIAGNOSIS — I49.49 PREMATURE BEATS: ICD-10-CM

## 2024-12-05 DIAGNOSIS — I87.2 CHRONIC VENOUS INSUFFICIENCY: ICD-10-CM

## 2024-12-05 DIAGNOSIS — E83.42 HYPOMAGNESEMIA: ICD-10-CM

## 2024-12-05 DIAGNOSIS — K43.9 VENTRAL HERNIA WITHOUT OBSTRUCTION OR GANGRENE: ICD-10-CM

## 2024-12-05 DIAGNOSIS — L97.522 NON-PRESSURE CHRONIC ULCER OF OTHER PART OF LEFT FOOT WITH FAT LAYER EXPOSED: ICD-10-CM

## 2024-12-05 DIAGNOSIS — M81.0 POSTMENOPAUSAL BONE LOSS: ICD-10-CM

## 2024-12-05 PROCEDURE — 3079F DIAST BP 80-89 MM HG: CPT | Performed by: INTERNAL MEDICINE

## 2024-12-05 PROCEDURE — 1157F ADVNC CARE PLAN IN RCRD: CPT | Performed by: INTERNAL MEDICINE

## 2024-12-05 PROCEDURE — 1159F MED LIST DOCD IN RCRD: CPT | Performed by: INTERNAL MEDICINE

## 2024-12-05 PROCEDURE — 3077F SYST BP >= 140 MM HG: CPT | Performed by: INTERNAL MEDICINE

## 2024-12-05 PROCEDURE — 99214 OFFICE O/P EST MOD 30 MIN: CPT | Performed by: INTERNAL MEDICINE

## 2024-12-05 PROCEDURE — 1170F FXNL STATUS ASSESSED: CPT | Performed by: INTERNAL MEDICINE

## 2024-12-05 PROCEDURE — 1160F RVW MEDS BY RX/DR IN RCRD: CPT | Performed by: INTERNAL MEDICINE

## 2024-12-05 PROCEDURE — G0439 PPPS, SUBSEQ VISIT: HCPCS | Performed by: INTERNAL MEDICINE

## 2024-12-05 PROCEDURE — 1036F TOBACCO NON-USER: CPT | Performed by: INTERNAL MEDICINE

## 2024-12-05 RX ORDER — BUPROPION HYDROCHLORIDE 300 MG/1
300 TABLET ORAL EVERY MORNING
Qty: 90 TABLET | Refills: 3 | Status: SHIPPED | OUTPATIENT
Start: 2024-12-05 | End: 2025-12-05

## 2024-12-05 SDOH — ECONOMIC STABILITY: FOOD INSECURITY: WITHIN THE PAST 12 MONTHS, YOU WORRIED THAT YOUR FOOD WOULD RUN OUT BEFORE YOU GOT MONEY TO BUY MORE.: NEVER TRUE

## 2024-12-05 SDOH — ECONOMIC STABILITY: FOOD INSECURITY: WITHIN THE PAST 12 MONTHS, THE FOOD YOU BOUGHT JUST DIDN'T LAST AND YOU DIDN'T HAVE MONEY TO GET MORE.: NEVER TRUE

## 2024-12-05 ASSESSMENT — ACTIVITIES OF DAILY LIVING (ADL)
TAKING_MEDICATION: INDEPENDENT
BATHING: INDEPENDENT
DOING_HOUSEWORK: INDEPENDENT
DRESSING: INDEPENDENT
GROCERY_SHOPPING: INDEPENDENT
MANAGING_FINANCES: INDEPENDENT

## 2024-12-05 ASSESSMENT — LIFESTYLE VARIABLES
AUDIT-C TOTAL SCORE: 1
HOW MANY STANDARD DRINKS CONTAINING ALCOHOL DO YOU HAVE ON A TYPICAL DAY: 1 OR 2
SKIP TO QUESTIONS 9-10: 1
HOW OFTEN DO YOU HAVE A DRINK CONTAINING ALCOHOL: MONTHLY OR LESS
HOW OFTEN DO YOU HAVE SIX OR MORE DRINKS ON ONE OCCASION: NEVER

## 2024-12-05 ASSESSMENT — ENCOUNTER SYMPTOMS
LOSS OF SENSATION IN FEET: 0
OCCASIONAL FEELINGS OF UNSTEADINESS: 1
DEPRESSION: 0

## 2024-12-05 ASSESSMENT — PATIENT HEALTH QUESTIONNAIRE - PHQ9
1. LITTLE INTEREST OR PLEASURE IN DOING THINGS: NOT AT ALL
2. FEELING DOWN, DEPRESSED OR HOPELESS: NOT AT ALL
SUM OF ALL RESPONSES TO PHQ9 QUESTIONS 1 & 2: 0

## 2024-12-05 NOTE — ASSESSMENT & PLAN NOTE
Resolved with pt s/p ventral hernia repair Dr. Lockhart (8/7/2024).  Follow-up with surgery as indicated.

## 2024-12-05 NOTE — ASSESSMENT & PLAN NOTE
Well managed/controlled. Continue with scheduled Pulmonology follow-up 5/6/2025).   Continue current medications including Wixela inhaler daily, albuterol rescue inhaler PRN.   Continue montelukast 10 mg daily and utilizing Flonase/Astelin nasal sprays for congestion.

## 2024-12-05 NOTE — ASSESSMENT & PLAN NOTE
Glucoses are well controlled, no med changes, continue to monitor     Normalized/resolved with HbA1c (11/25/2024) improving from 5.8 to 5.6.   For now, continue taking metformin 850 mg BID.   Check labs, to be completed by patient prior to next follow-up visit.

## 2024-12-05 NOTE — ASSESSMENT & PLAN NOTE
Elevated but stable BP reading of 145/80 today, comparable to prior.   Continue taking amlodipine 10 mg daily with annual Cardiology follow-up (9/2/2025).

## 2024-12-05 NOTE — ASSESSMENT & PLAN NOTE
Medicare wellness exam completed today, TDAP vaccine recommended, colon cancer screening / colonoscopy will be completed by Dr Ramirez  Orders:    1 Year Follow Up In Primary Care - Wellness Exam; Future

## 2024-12-05 NOTE — PROGRESS NOTES
Subjective   Reason for Visit: Tonja Castillo is an 79 y.o. female here for a Medicare Wellness visit.     Past Medical, Surgical, and Family History reviewed and updated in chart.    Reviewed all medications by prescribing practitioner or clinical pharmacist (such as prescriptions, OTCs, herbal therapies and supplements) and documented in the medical record.    HPI  Follow up and Medicare wellness exam:     Chronic venous insufficiency - related to R calf swelling: At prior visit, pt presenting with localized swelling of the R calf with some evidence of venous insufficiency. Patient was referred to Physical Medicine Rehab. MRI considered.  Normal R tibula/fibula XR (9/20/2024) without evidence fracture/pathological findings. Patient did follow up with Dr Coffman.  BLE vascular US (12/7/2023): reflux in bilateral common femoral veins and L saphofemoral junction vein. No DVT.   Followed by Cardiology (Dr. Swift, seen 9/3/2024) suspecting chronic venous insufficiency.     Today, reports significant improvement with no additional concerns. Attributed to regularly wearing compression stocking (recommended by Cardiology) and applying Voltaren gel/Aspercreme (recommended by Dr. Coffman in [facility]).     HTN: Elevated but stable and comparable to prior with BP reading of 145/80 today.   Pt taking amlodipine 10 mg daily. Followed by Cardiology (Dr. Swift, seen 9/3/2024) with no medication changes made.     Mild CAD: Last lipid panel (11/25/2024) with cholesterol 156, LDL 64, HDL 70.7, and .   Pt taking ASA 81 mg daily, amlodipine 10 mg daily, and atorvastatin 40 mg daily.  Denies medication side effects including myalgias.   Followed by Cardiology (Dr. Swift, seen 9/3/2024) with no medication changes made, patient will see Dr Swift for follow up next year.     DM type 2:  It has improved. Improved HgbA1c (11/25/2024) from 5.8 to 5.6 . Currently taking metformin 850 mg BID.     Depression: Well managed, with pt  "taking Wellbutrin  mg BID. Occasionally will forgets afternoon dose.   Does not feel a difference taking it or not.   Would be interested in changing dosages and/or from SR to XL for medication regimen simplification.     Hypothyroidism: Subclinical with elevated TSH (4.05) and normal T4 (0.93).   Taking levothyroxine 112 mcg dose daily.     Ventral hernia (repaired): S/p ventral hernia repair Dr. Lockhart at [facility] on 8/7/2024.   No complications noted with no concerns reported by patient.     Asthma: Well managed/controlled with Wixela inhaler daily and albuterol rescue inhaler PRN (not used for a while).   Also taking montelukast 10 mg daily. Very rarely uses Flonase/Astelin nasal sprays for congestion.   Previously seen by Pulmonology and completed PFTs. Allergic to cats (allergy testing).   Pulmonology follow-up (5/6/2025).     Hypomagnesemia: Taking magnesium oxide 400 mg daily.      History of colon cancer: S/p right hemicolectomy in 8/2020 (Dr Pereira , since retired) with colon cancer discovered during a colonoscopy.   Repeat colonoscopy every 3 years with Dr. Lockhart. Last colonoscopy (10/10/2023) showing once sessile serrated adenoma. No post-op complications noted.     foot ulcer: patient had a large callus that was trimmed by Dr Patel, patient is monitored by podiatry every 10 weeks.     Trochanteric bursitis of Right hip: Controlled with no acute concerns. Taking gabapentin 300 mg nightly.      Patient Care Team:  Melo Benjamin MD as PCP - General (Internal Medicine)  Melo Benjamin MD as PCP - [employer] PCP     Review of Systems    ROS otherwise negative aside from what was mentioned above in HPI.     Objective   Vitals:  /80 (BP Location: Left arm, Patient Position: Sitting, BP Cuff Size: Adult)   Pulse 69   Temp 36.6 °C (97.8 °F) (Temporal)   Resp 16   Ht 1.613 m (5' 3.5\")   Wt 73.7 kg (162 lb 6.4 oz)   SpO2 99%   BMI 28.32 kg/m²       Physical " Exam  Constitutional   General appearance: Alert and in no acute distress. well developed, well nourished, overweight and pleasant    Eyes   Inspection of eyes: Sclera and conjunctiva were normal.   Ears, Nose, Mouth, and Throat   Ears: Auricles: Normal. No thyromegaly or neck masses are noted   Pulmonary   Respiratory assessment: No respiratory distress, normal respiratory rhythm and effort.    Auscultation of lungs: Auscultation of the lungs revealed no wheezing, no rales or crackles were heard bilaterally. no rhonchi. no friction rub. No diminished breath sounds. no bronchial breath sounds.   Cardiovascular   Auscultation of heart: Abnormal.  The heart rate was normal. The rhythm was regular, occasional skipped beat is noted. Heart sounds: normal S1 and normal S2. A grade 2/6 systolic murmur was heard at the LUSB. A grade 1/6 systolic murmur was heard at the RLSB. Unchanged. No carotid bruits are noted.   Trace edema of BLE. Bilateral calves symmetrical in size. No elicited tenderness with palpation. Varicosities are noted (L>R)   Abdomen  A midline healed scar is noted  Lymphatic   Palpation of lymph nodes in neck: No cervical lymphadenopathy.   Neurologic   Cranial nerves: Nerves 2-12 were intact, no focal neuro defects.   Psychiatric   Orientation: Oriented to person, place, and time.    Mood and affect: Normal. Endorses mild depression, unchanged from prior  MSK  Bilateral calves are soft to palpation without elicited tenderness or noticeable circumferential asymmetry    Skin  No skin lesions visible on exposed skin.   Assessment & Plan  Chronic venous insufficiency  Reviewed notes from prior visit; likely etiology pertaining to chronic venous insufficiency.   Continue wearing compression stockings and applying Aspercreme/Voltaren gel PRN.    Follow-up with cardiology (Dr. Swift, seen 9/2/2025) in one year as indicated.   Orders:    Comprehensive Metabolic Panel; Future    Lipid Panel; Future    Benign  essential hypertension  Elevated but stable BP reading of 145/80 today, comparable to prior.   Continue taking amlodipine 10 mg daily with annual Cardiology follow-up (9/2/2025).        Mild CAD  Reviewed most recent lipid panel (11/25/2024) with cholesterol 156, LDL 64, HDL 70.7, and .   Continue taking ASA 81 mg daily, amlodipine 10 mg daily, and atorvastatin 40 mg daily.  Reviewed cardiology note (Dr. Swift, seen 9/3/2024), annual Cardiology follow-up (9/2/2025).   Check labs, to be completed by patient prior to next follow-up visit.  Patient has some premature heart beats, will order an ECG.   Orders:    CBC and Auto Differential; Future    Comprehensive Metabolic Panel; Future    Lipid Panel; Future    ECG 12 lead (Ancillary Performed); Future    Major depressive disorder in full remission, unspecified whether recurrent (CMS-HCC)  Not well controlled. Discussed increasing dosage and/or changing from SR to XL Wellbutrin.   Changed to Wellbutrin  mg daily.  Previously taking Wellbutrin  mg BID.   Orders:    buPROPion XL (Wellbutrin XL) 300 mg 24 hr tablet; Take 1 tablet (300 mg) by mouth once daily in the morning. Do not crush, chew, or split.    Subclinical hypothyroidism  Subclinical with elevated TSH (4.05) and normal T4 (0.93).   Continue levothyroxine 112 mcg dose daily.   Check labs, to be completed by patient prior to next follow-up visit.  Will increase the dose of levothyroxine if needed  Orders:    TSH with reflex to Free T4 if abnormal; Future    Ventral hernia without obstruction or gangrene  Resolved with pt s/p ventral hernia repair Dr. Lockhart (8/7/2024).  Follow-up with surgery as indicated.         Moderate persistent asthma without complication ([facility]-HCC)  Well managed/controlled. Continue with scheduled Pulmonology follow-up 5/6/2025).   Continue current medications including Wixela inhaler daily, albuterol rescue inhaler PRN.   Continue montelukast 10 mg daily and utilizing  Flonase/Astelin nasal sprays for congestion.        Hypomagnesemia  Continue taking magnesium oxide 400 mg daily.   Check labs, to be completed by patient prior to next follow-up visit.   Orders:    Comprehensive Metabolic Panel; Future    Magnesium; Future    History of colon cancer  S/p right hemicolectomy in 8/2020  with repeat colonoscopy every 3 years with Dr. Lockhart.   Reviewed most recent colonoscopy (10/10/2023) showing once sessile serrated adenoma.   Continue 3-year repeat colonoscopy unless otherwise indicated.        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 Patel) follow-up q10 weeks as indicated.        Trochanteric bursitis of right hip  Controlled with no concerns.   Continue gabapentin 300 mg nightly.        Postmenopausal bone loss  Vitamin D wnl at 79 on 11/25/2024.   Recheck with labs ordered to be completed by patient prior to next visit.   Orders:    Vitamin D 25-Hydroxy,Total (for eval of Vitamin D levels); Future    Routine general medical examination at health care facility Medicare wellness exam completed today, TDAP vaccine recommended, colon cancer screening / colonoscopy will be completed by Dr Ramirez  Orders:    1 Year Follow Up In Primary Care - Wellness Exam; Future    Type 2 diabetes mellitus with obesity (Multi)  Glucoses are well controlled, no med changes, continue to monitor     Normalized/resolved with HbA1c (11/25/2024) improving from 5.8 to 5.6.   For now, continue taking metformin 850 mg BID.   Check labs, to be completed by patient prior to next follow-up visit.   Vitamin B12 deficiency  Stable, monitor  Orders:    Vitamin B12; Future    Vitamin D deficiency  Stable, monitor, follow up lab ordered       Malignant neoplasm of colon, unspecified part of colon (Multi)  Follow up with surgery for colonoscopy as recommended       Premature beats  Check an ECG, patient has no symptoms   Orders:    ECG 12 lead  (Ancillary Performed); Future    Need for Tdap vaccination    Orders:    diphth,pertus,acell,,tetanus (BoostRIX) 2.5-8-5 Lf-mcg-Lf/0.5mL injection; Inject 0.5 mL into the muscle 1 time for 1 dose.    Medicare wellness visit completed today.   Follow up in 6 months or sooner as indicated  Labs ordered today to be completed by patient prior to next visit.

## 2024-12-05 NOTE — ASSESSMENT & PLAN NOTE
Continue taking magnesium oxide 400 mg daily.   Check labs, to be completed by patient prior to next follow-up visit.   Orders:    Comprehensive Metabolic Panel; Future    Magnesium; Future

## 2024-12-05 NOTE — ASSESSMENT & PLAN NOTE
Not well controlled. Discussed increasing dosage and/or changing from SR to XL Wellbutrin.   Changed to Wellbutrin  mg daily.  Previously taking Wellbutrin  mg BID.   Orders:    buPROPion XL (Wellbutrin XL) 300 mg 24 hr tablet; Take 1 tablet (300 mg) by mouth once daily in the morning. Do not crush, chew, or split.

## 2024-12-21 DIAGNOSIS — J45.901 EXACERBATION OF ASTHMA, UNSPECIFIED ASTHMA SEVERITY, UNSPECIFIED WHETHER PERSISTENT (HHS-HCC): ICD-10-CM

## 2024-12-21 DIAGNOSIS — E87.6 HYPOKALEMIA: ICD-10-CM

## 2024-12-23 RX ORDER — POTASSIUM CHLORIDE 750 MG/1
10 TABLET, EXTENDED RELEASE ORAL DAILY
Qty: 90 TABLET | Refills: 1 | Status: SHIPPED | OUTPATIENT
Start: 2024-12-23

## 2024-12-23 RX ORDER — MONTELUKAST SODIUM 10 MG/1
10 TABLET ORAL DAILY
Qty: 90 TABLET | Refills: 1 | Status: SHIPPED | OUTPATIENT
Start: 2024-12-23

## 2025-01-01 DIAGNOSIS — E11.69 TYPE 2 DIABETES MELLITUS WITH OBESITY (MULTI): ICD-10-CM

## 2025-01-01 DIAGNOSIS — E66.9 TYPE 2 DIABETES MELLITUS WITH OBESITY (MULTI): ICD-10-CM

## 2025-01-02 RX ORDER — METFORMIN HYDROCHLORIDE 850 MG/1
850 TABLET ORAL
Qty: 180 TABLET | Refills: 1 | Status: SHIPPED | OUTPATIENT
Start: 2025-01-02

## 2025-02-12 DIAGNOSIS — E03.9 ACQUIRED HYPOTHYROIDISM: ICD-10-CM

## 2025-02-12 RX ORDER — LEVOTHYROXINE SODIUM 112 UG/1
112 TABLET ORAL DAILY
Qty: 90 TABLET | Refills: 1 | Status: SHIPPED | OUTPATIENT
Start: 2025-02-12 | End: 2026-02-12

## 2025-02-12 NOTE — TELEPHONE ENCOUNTER
Meds refill    levothyroxine (Synthroid, Levoxyl) 112 mcg tablet [[id number]]     New Pharmacy: [facility], [facility]. [place], [place]

## 2025-02-13 DIAGNOSIS — M70.61 TROCHANTERIC BURSITIS OF RIGHT HIP: ICD-10-CM

## 2025-02-13 DIAGNOSIS — E78.5 HYPERLIPIDEMIA, UNSPECIFIED HYPERLIPIDEMIA TYPE: ICD-10-CM

## 2025-02-13 RX ORDER — GABAPENTIN 300 MG/1
300 CAPSULE ORAL NIGHTLY
Qty: 90 CAPSULE | Refills: 1 | Status: SHIPPED | OUTPATIENT
Start: 2025-02-13

## 2025-02-13 RX ORDER — ATORVASTATIN CALCIUM 40 MG/1
40 TABLET, FILM COATED ORAL NIGHTLY
Qty: 90 TABLET | Refills: 1 | Status: SHIPPED | OUTPATIENT
Start: 2025-02-13

## 2025-04-10 DIAGNOSIS — E83.42 HYPOMAGNESEMIA: ICD-10-CM

## 2025-04-10 RX ORDER — LANOLIN ALCOHOL/MO/W.PET/CERES
CREAM (GRAM) TOPICAL
Qty: 90 TABLET | Refills: 1 | Status: SHIPPED | OUTPATIENT
Start: 2025-04-10

## 2025-05-06 ENCOUNTER — OFFICE VISIT (OUTPATIENT)
Dept: PULMONOLOGY | Facility: HOSPITAL | Age: 80
End: 2025-05-06
Payer: MEDICARE

## 2025-05-06 ENCOUNTER — HOSPITAL ENCOUNTER (OUTPATIENT)
Dept: CARDIOLOGY | Facility: HOSPITAL | Age: 80
Discharge: HOME | End: 2025-05-06
Payer: MEDICARE

## 2025-05-06 VITALS
TEMPERATURE: 98 F | OXYGEN SATURATION: 96 % | BODY MASS INDEX: 28.17 KG/M2 | RESPIRATION RATE: 16 BRPM | SYSTOLIC BLOOD PRESSURE: 138 MMHG | WEIGHT: 159 LBS | DIASTOLIC BLOOD PRESSURE: 76 MMHG | HEIGHT: 63 IN | HEART RATE: 84 BPM

## 2025-05-06 DIAGNOSIS — I25.10 MILD CAD: ICD-10-CM

## 2025-05-06 DIAGNOSIS — J45.909 ASTHMA, UNSPECIFIED ASTHMA SEVERITY, UNSPECIFIED WHETHER COMPLICATED, UNSPECIFIED WHETHER PERSISTENT (HHS-HCC): Primary | ICD-10-CM

## 2025-05-06 DIAGNOSIS — I49.49 PREMATURE BEATS: ICD-10-CM

## 2025-05-06 DIAGNOSIS — J30.89 PERENNIAL ALLERGIC RHINITIS WITH SEASONAL VARIATION: ICD-10-CM

## 2025-05-06 DIAGNOSIS — J30.2 PERENNIAL ALLERGIC RHINITIS WITH SEASONAL VARIATION: ICD-10-CM

## 2025-05-06 LAB
ATRIAL RATE: 79 BPM
P AXIS: 51 DEGREES
PR INTERVAL: 156 MS
Q ONSET: 249 MS
QRS COUNT: 11 BEATS
QRS DURATION: 109 MS
QT INTERVAL: 375 MS
QTC CALCULATION(BAZETT): 419 MS
QTC FREDERICIA: 403 MS
R AXIS: -11 DEGREES
T AXIS: 33 DEGREES
T OFFSET: 437 MS
VENTRICULAR RATE: 75 BPM

## 2025-05-06 PROCEDURE — 99213 OFFICE O/P EST LOW 20 MIN: CPT | Performed by: NURSE PRACTITIONER

## 2025-05-06 PROCEDURE — 1159F MED LIST DOCD IN RCRD: CPT | Performed by: NURSE PRACTITIONER

## 2025-05-06 PROCEDURE — 93010 ELECTROCARDIOGRAM REPORT: CPT | Performed by: INTERNAL MEDICINE

## 2025-05-06 PROCEDURE — 1157F ADVNC CARE PLAN IN RCRD: CPT | Performed by: NURSE PRACTITIONER

## 2025-05-06 PROCEDURE — 93005 ELECTROCARDIOGRAM TRACING: CPT

## 2025-05-06 PROCEDURE — 3078F DIAST BP <80 MM HG: CPT | Performed by: NURSE PRACTITIONER

## 2025-05-06 PROCEDURE — 3075F SYST BP GE 130 - 139MM HG: CPT | Performed by: NURSE PRACTITIONER

## 2025-05-06 ASSESSMENT — ENCOUNTER SYMPTOMS
CHILLS: 0
FEVER: 0
DEPRESSION: 0
FATIGUE: 0
RHINORRHEA: 0
COUGH: 1
WHEEZING: 0
SHORTNESS OF BREATH: 0
UNEXPECTED WEIGHT CHANGE: 0
LOSS OF SENSATION IN FEET: 0
OCCASIONAL FEELINGS OF UNSTEADINESS: 0

## 2025-05-06 ASSESSMENT — COLUMBIA-SUICIDE SEVERITY RATING SCALE - C-SSRS
1. IN THE PAST MONTH, HAVE YOU WISHED YOU WERE DEAD OR WISHED YOU COULD GO TO SLEEP AND NOT WAKE UP?: NO
6. HAVE YOU EVER DONE ANYTHING, STARTED TO DO ANYTHING, OR PREPARED TO DO ANYTHING TO END YOUR LIFE?: NO
2. HAVE YOU ACTUALLY HAD ANY THOUGHTS OF KILLING YOURSELF?: NO

## 2025-05-06 ASSESSMENT — PATIENT HEALTH QUESTIONNAIRE - PHQ9
SUM OF ALL RESPONSES TO PHQ9 QUESTIONS 1 AND 2: 0
2. FEELING DOWN, DEPRESSED OR HOPELESS: NOT AT ALL
1. LITTLE INTEREST OR PLEASURE IN DOING THINGS: NOT AT ALL

## 2025-05-06 NOTE — PROGRESS NOTES
Subjective   Patient ID: Tonja Castillo is a 80 y.o. female who presents for follow up for asthma and sinus symptoms.     HPI: Patient reports having hx of childhood asthma was well controlled with prn albuterol. She says she had an asthma exacerbation in 2016 but was mild and recovered. However, she had an exacerbation in Dec 2022 and was taking nebulizer and prn albuterol but did not get better. She saw Dr. Benjamin on Jan 24, 23 and was started on wixela which has resolved her symptoms completely. She did not need prednisone but did get atbx in Dec 2022. She is currently asymptomatic.      Today she is here for follow up. She states that her breathing has been stable on Wixela just once a day in the evening. She has not had to use albuterol since last time she was here July 2024. She has had some increased sinus drainage lately but she is not taking Flonase and Azelastine currently. She is taking Allegra and Montelukast daily. She has no other concerns.     Review of Systems   Constitutional:  Negative for chills, fatigue, fever and unexpected weight change.   HENT:  Positive for congestion. Negative for postnasal drip and rhinorrhea.    Respiratory:  Positive for cough. Negative for shortness of breath and wheezing.    Cardiovascular:  Negative for chest pain and leg swelling.   All other systems reviewed and are negative.      Objective   Physical Exam  Vitals reviewed.   Constitutional:       Appearance: Normal appearance.   HENT:      Head: Normocephalic.   Cardiovascular:      Rate and Rhythm: Normal rate and regular rhythm.   Pulmonary:      Effort: Pulmonary effort is normal.      Breath sounds: Examination of the right-upper field reveals wheezing. Examination of the left-upper field reveals wheezing. Wheezing present.   Skin:     General: Skin is warm and dry.   Neurological:      Mental Status: She is alert.         Assessment/Plan   1. Moderate persistent asthma  2. Allergic rhinitis     Plan:      Patient has long standing history of bronchial asthma since childhood. She has had a mild exacerbation around 2016 and again had moderate exacerbation with prolonged residual symptoms of hyperreactivity in Dec 2022. She is getting again exacerbations off and on, today July 2, 24 in with worsening symptoms c/w mild exacerbation.     -PFTs were normal.   -She was able to stop Wixela altogether last year without worsening of her breathing. She has not needed albuterol. She is still getting exacerbations 1-2 times a year. Advise Wixela now for a month and take albuterol prn with low threshold. She is now stable on Wixela once a day, I discussed if symptoms worsen with season changes then increase back to twice a day. She is agreeable.    -Check IgE 47, RAST + for dogs and cats. Eos 330. She takes OTC allegra daily with Montelukast HS. She has not taken her Azelastine or Flonase lately, I will have her restart this prn for increased sinus drainage.      Overall she has been controlled on Wixela just once a day, will continue on this. I instructed her if the season changes worsen her breathing to restart at twice a day. I will start her back on nasal sprays for increased sinus drainage. She can follow up on an as needed basis if any worsening of her asthma. Patient is agreeable to current plan of care.

## 2025-05-06 NOTE — PATIENT INSTRUCTIONS
Continue on Wixela as discussed.  Continue albuterol as needed.  Continue on Allegra and Montelukast daily.   Start back on Flonase and Azelastine nasal spray as needed.   Call with any questions or concerns.  Follow up with me as needed.

## 2025-06-06 LAB
25(OH)D3+25(OH)D2 SERPL-MCNC: 66 NG/ML (ref 30–100)
ALBUMIN SERPL-MCNC: 4.3 G/DL (ref 3.6–5.1)
ALBUMIN/CREAT UR: 2 MG/G CREAT
ALP SERPL-CCNC: 70 U/L (ref 37–153)
ALT SERPL-CCNC: 11 U/L (ref 6–29)
ANION GAP SERPL CALCULATED.4IONS-SCNC: 10 MMOL/L (CALC) (ref 7–17)
AST SERPL-CCNC: 17 U/L (ref 10–35)
BASOPHILS # BLD AUTO: 48 CELLS/UL (ref 0–200)
BASOPHILS NFR BLD AUTO: 0.7 %
BILIRUB SERPL-MCNC: 0.7 MG/DL (ref 0.2–1.2)
BUN SERPL-MCNC: 26 MG/DL (ref 7–25)
CALCIUM SERPL-MCNC: 9.2 MG/DL (ref 8.6–10.4)
CHLORIDE SERPL-SCNC: 107 MMOL/L (ref 98–110)
CHOLEST SERPL-MCNC: 166 MG/DL
CHOLEST/HDLC SERPL: 2.1 (CALC)
CO2 SERPL-SCNC: 25 MMOL/L (ref 20–32)
CREAT SERPL-MCNC: 0.96 MG/DL (ref 0.6–1)
CREAT UR-MCNC: 97 MG/DL (ref 20–275)
EGFRCR SERPLBLD CKD-EPI 2021: 60 ML/MIN/1.73M2
EOSINOPHIL # BLD AUTO: 292 CELLS/UL (ref 15–500)
EOSINOPHIL NFR BLD AUTO: 4.3 %
ERYTHROCYTE [DISTWIDTH] IN BLOOD BY AUTOMATED COUNT: 12.5 % (ref 11–15)
EST. AVERAGE GLUCOSE BLD GHB EST-MCNC: 120 MG/DL
EST. AVERAGE GLUCOSE BLD GHB EST-SCNC: 6.6 MMOL/L
GLUCOSE SERPL-MCNC: 92 MG/DL (ref 65–99)
HBA1C MFR BLD: 5.8 %
HCT VFR BLD AUTO: 39.2 % (ref 35–45)
HDLC SERPL-MCNC: 80 MG/DL
HGB BLD-MCNC: 12.6 G/DL (ref 11.7–15.5)
LDLC SERPL CALC-MCNC: 68 MG/DL (CALC)
LYMPHOCYTES # BLD AUTO: 2305 CELLS/UL (ref 850–3900)
LYMPHOCYTES NFR BLD AUTO: 33.9 %
MAGNESIUM SERPL-MCNC: 1.2 MG/DL (ref 1.5–2.5)
MCH RBC QN AUTO: 30.5 PG (ref 27–33)
MCHC RBC AUTO-ENTMCNC: 32.1 G/DL (ref 32–36)
MCV RBC AUTO: 94.9 FL (ref 80–100)
MICROALBUMIN UR-MCNC: 0.2 MG/DL
MONOCYTES # BLD AUTO: 422 CELLS/UL (ref 200–950)
MONOCYTES NFR BLD AUTO: 6.2 %
NEUTROPHILS # BLD AUTO: 3733 CELLS/UL (ref 1500–7800)
NEUTROPHILS NFR BLD AUTO: 54.9 %
NONHDLC SERPL-MCNC: 86 MG/DL (CALC)
PLATELET # BLD AUTO: 340 THOUSAND/UL (ref 140–400)
PMV BLD REES-ECKER: 9.5 FL (ref 7.5–12.5)
POTASSIUM SERPL-SCNC: 4 MMOL/L (ref 3.5–5.3)
PROT SERPL-MCNC: 6.8 G/DL (ref 6.1–8.1)
RBC # BLD AUTO: 4.13 MILLION/UL (ref 3.8–5.1)
SODIUM SERPL-SCNC: 142 MMOL/L (ref 135–146)
TRIGL SERPL-MCNC: 94 MG/DL
TSH SERPL-ACNC: 1.6 MIU/L (ref 0.4–4.5)
VIT B12 SERPL-MCNC: 1143 PG/ML (ref 200–1100)
WBC # BLD AUTO: 6.8 THOUSAND/UL (ref 3.8–10.8)

## 2025-06-09 ENCOUNTER — APPOINTMENT (OUTPATIENT)
Dept: PRIMARY CARE | Facility: CLINIC | Age: 80
End: 2025-06-09
Payer: MEDICARE

## 2025-06-09 VITALS
RESPIRATION RATE: 20 BRPM | SYSTOLIC BLOOD PRESSURE: 150 MMHG | OXYGEN SATURATION: 96 % | BODY MASS INDEX: 28.72 KG/M2 | DIASTOLIC BLOOD PRESSURE: 80 MMHG | WEIGHT: 162.1 LBS | HEIGHT: 63 IN | HEART RATE: 79 BPM | TEMPERATURE: 96.4 F

## 2025-06-09 DIAGNOSIS — I10 BENIGN ESSENTIAL HYPERTENSION: ICD-10-CM

## 2025-06-09 DIAGNOSIS — G56.00: ICD-10-CM

## 2025-06-09 DIAGNOSIS — E03.9 ACQUIRED HYPOTHYROIDISM: ICD-10-CM

## 2025-06-09 DIAGNOSIS — E11.42 DIABETIC POLYNEUROPATHY ASSOCIATED WITH TYPE 2 DIABETES MELLITUS: ICD-10-CM

## 2025-06-09 DIAGNOSIS — E55.9 VITAMIN D DEFICIENCY: ICD-10-CM

## 2025-06-09 DIAGNOSIS — E66.9 TYPE 2 DIABETES MELLITUS WITH OBESITY (MULTI): ICD-10-CM

## 2025-06-09 DIAGNOSIS — E11.9 TYPE 2 DIABETES MELLITUS WITHOUT COMPLICATION, WITHOUT LONG-TERM CURRENT USE OF INSULIN: ICD-10-CM

## 2025-06-09 DIAGNOSIS — Z00.00 ROUTINE GENERAL MEDICAL EXAMINATION AT HEALTH CARE FACILITY: Primary | ICD-10-CM

## 2025-06-09 DIAGNOSIS — E11.41: ICD-10-CM

## 2025-06-09 DIAGNOSIS — E78.5 HYPERLIPIDEMIA, UNSPECIFIED HYPERLIPIDEMIA TYPE: ICD-10-CM

## 2025-06-09 DIAGNOSIS — E11.42 TYPE 2 DIABETES MELLITUS WITH DIABETIC POLYNEUROPATHY, WITHOUT LONG-TERM CURRENT USE OF INSULIN: ICD-10-CM

## 2025-06-09 DIAGNOSIS — E53.8 VITAMIN B12 DEFICIENCY: ICD-10-CM

## 2025-06-09 DIAGNOSIS — M85.89 OSTEOPENIA OF MULTIPLE SITES: ICD-10-CM

## 2025-06-09 DIAGNOSIS — C18.2 MALIGNANT NEOPLASM OF ASCENDING COLON (MULTI): ICD-10-CM

## 2025-06-09 DIAGNOSIS — E11.69 TYPE 2 DIABETES MELLITUS WITH OBESITY (MULTI): ICD-10-CM

## 2025-06-09 DIAGNOSIS — M81.0 POSTMENOPAUSAL BONE LOSS: ICD-10-CM

## 2025-06-09 PROCEDURE — 99214 OFFICE O/P EST MOD 30 MIN: CPT | Performed by: INTERNAL MEDICINE

## 2025-06-09 PROCEDURE — G0439 PPPS, SUBSEQ VISIT: HCPCS | Performed by: INTERNAL MEDICINE

## 2025-06-09 PROCEDURE — 3079F DIAST BP 80-89 MM HG: CPT | Performed by: INTERNAL MEDICINE

## 2025-06-09 PROCEDURE — 1170F FXNL STATUS ASSESSED: CPT | Performed by: INTERNAL MEDICINE

## 2025-06-09 PROCEDURE — 1036F TOBACCO NON-USER: CPT | Performed by: INTERNAL MEDICINE

## 2025-06-09 PROCEDURE — 3077F SYST BP >= 140 MM HG: CPT | Performed by: INTERNAL MEDICINE

## 2025-06-09 PROCEDURE — 1159F MED LIST DOCD IN RCRD: CPT | Performed by: INTERNAL MEDICINE

## 2025-06-09 PROCEDURE — 1126F AMNT PAIN NOTED NONE PRSNT: CPT | Performed by: INTERNAL MEDICINE

## 2025-06-09 SDOH — ECONOMIC STABILITY: FOOD INSECURITY: WITHIN THE PAST 12 MONTHS, YOU WORRIED THAT YOUR FOOD WOULD RUN OUT BEFORE YOU GOT MONEY TO BUY MORE.: NEVER TRUE

## 2025-06-09 SDOH — ECONOMIC STABILITY: FOOD INSECURITY: WITHIN THE PAST 12 MONTHS, THE FOOD YOU BOUGHT JUST DIDN'T LAST AND YOU DIDN'T HAVE MONEY TO GET MORE.: NEVER TRUE

## 2025-06-09 ASSESSMENT — LIFESTYLE VARIABLES
AUDIT-C TOTAL SCORE: 0
SKIP TO QUESTIONS 9-10: 1
HOW OFTEN DO YOU HAVE A DRINK CONTAINING ALCOHOL: NEVER
HOW OFTEN DO YOU HAVE SIX OR MORE DRINKS ON ONE OCCASION: NEVER
HOW MANY STANDARD DRINKS CONTAINING ALCOHOL DO YOU HAVE ON A TYPICAL DAY: PATIENT DOES NOT DRINK

## 2025-06-09 ASSESSMENT — ENCOUNTER SYMPTOMS
OCCASIONAL FEELINGS OF UNSTEADINESS: 0
LOSS OF SENSATION IN FEET: 1
DEPRESSION: 0

## 2025-06-09 ASSESSMENT — ACTIVITIES OF DAILY LIVING (ADL)
DRESSING: INDEPENDENT
GROCERY_SHOPPING: INDEPENDENT
MANAGING_FINANCES: INDEPENDENT
DOING_HOUSEWORK: INDEPENDENT
TAKING_MEDICATION: INDEPENDENT
BATHING: INDEPENDENT

## 2025-06-09 ASSESSMENT — ANXIETY QUESTIONNAIRES
3. WORRYING TOO MUCH ABOUT DIFFERENT THINGS: NOT AT ALL
1. FEELING NERVOUS, ANXIOUS, OR ON EDGE: NOT AT ALL
2. NOT BEING ABLE TO STOP OR CONTROL WORRYING: NOT AT ALL
6. BECOMING EASILY ANNOYED OR IRRITABLE: NOT AT ALL
IF YOU CHECKED OFF ANY PROBLEMS ON THIS QUESTIONNAIRE, HOW DIFFICULT HAVE THESE PROBLEMS MADE IT FOR YOU TO DO YOUR WORK, TAKE CARE OF THINGS AT HOME, OR GET ALONG WITH OTHER PEOPLE: NOT DIFFICULT AT ALL
4. TROUBLE RELAXING: NOT AT ALL
5. BEING SO RESTLESS THAT IT IS HARD TO SIT STILL: NOT AT ALL
7. FEELING AFRAID AS IF SOMETHING AWFUL MIGHT HAPPEN: NOT AT ALL
GAD7 TOTAL SCORE: 0

## 2025-06-09 ASSESSMENT — PAIN SCALES - GENERAL: PAINLEVEL_OUTOF10: 0-NO PAIN

## 2025-06-09 NOTE — ASSESSMENT & PLAN NOTE
Continue amlodipine for now, BP is 150/80, patient cannot take ACE inhibitor, trial of low dose Jardiance, patient is not able to take an ACE inhibitor or ARB for CKD risk reduction  Orders:    empagliflozin (Jardiance) 10 mg tablet; Take 1 tablet (10 mg) by mouth once daily.    Referral to Clinical Pharmacy; Future    CBC and Auto Differential; Future    Comprehensive Metabolic Panel; Future

## 2025-06-09 NOTE — ASSESSMENT & PLAN NOTE
No med changes, continue to monitor  Orders:    CBC and Auto Differential; Future    Comprehensive Metabolic Panel; Future    TSH with reflex to Free T4 if abnormal; Future

## 2025-06-09 NOTE — PROGRESS NOTES
Subjective   Reason for Visit: Tonja Castillo is an 80 y.o. female here for a Medicare Wellness visit.     Past Medical, Surgical, and Family History reviewed and updated in chart.    Reviewed all medications by prescribing practitioner or clinical pharmacist (such as prescriptions, OTCs, herbal therapies and supplements) and documented in the medical record.    HPI    follow up and Medicare wellness exam:      Chronic venous insufficiency - related to R calf swelling: BLE vascular US (12/7/2023): reflux in bilateral common femoral veins and L saphofemoral junction vein. No DVT.   Followed by Cardiology (Dr. Swift, seen 9/3/2024) suspecting chronic venous insufficiency.      HTN: Elevated but stable  Pt taking amlodipine 10 mg daily. Followed by Cardiology (Dr. Swift) in 9/2025     Mild CAD:    Pt taking ASA 81 mg daily, amlodipine 10 mg daily, and atorvastatin 40 mg daily.  Denies medication side effects including myalgias.   Followed by Cardiology  with no medication changes made, patient will see Dr Swift for follow up this year.      DM type 2:  It has improved. Currently taking metformin 850 mg BID.      Depression: Well managed, patient now takes bupropion  mg daily     Hypothyroidism: Taking levothyroxine 112 mcg dose daily.      Ventral hernia (repaired): S/p ventral hernia repair Dr. Lockhart at [facility] on 8/7/2024.   No complications noted with no concerns reported by patient.      Asthma: Well managed/controlled with Wixela inhaler daily and albuterol rescue inhaler PRN .   Also taking montelukast 10 mg daily.   Previously seen by Pulmonology and completed PFTs. Allergic to cats (allergy testing).   Pulmonology follow-up (5/6/2025).      Hypomagnesemia: Taking magnesium oxide 400 mg daily.      History of colon cancer: S/p right hemicolectomy in 8/2020 (Dr Pereira , since retired) with colon cancer discovered during a colonoscopy.   Repeat colonoscopy every 3 years with Dr. Lockhart. Last  "colonoscopy (10/10/2023) showing a sessile serrated adenoma. No post-op complications noted.  It appears that the patient is due for recheck in 10/2026     Foot callus: patient had a large callus that was trimmed by Dr Patel, patient is monitored by podiatry every 10 weeks. No foot ulcers now.      Trochanteric bursitis of Right hip: Controlled with no acute concerns. Taking gabapentin 300 mg nightly.     Patient Care Team:  Melo Benjamin MD as PCP - General (Internal Medicine)  Melo Benjamin MD as PCP - [employer] PCP     Review of Systems    otherwise negative aside from what was mentioned above in HPI.     Objective   Vitals:  /80 (BP Location: Right arm, Patient Position: Sitting, BP Cuff Size: Adult)   Pulse 79   Temp 35.8 °C (96.4 °F) (Temporal)   Resp 20   Ht 1.6 m (5' 3\")   Wt 73.5 kg (162 lb 1.6 oz)   SpO2 96%   BMI 28.71 kg/m²       Physical Exam    Constitutional   General appearance: Alert and in no acute distress. well developed, well nourished, overweight and pleasant    Eyes   Inspection of eyes: Sclera and conjunctiva were normal.   Ears, Nose, Mouth, and Throat   Ears: Auricles: Normal. No thyromegaly or neck masses are noted   Pulmonary   Respiratory assessment: No respiratory distress, normal respiratory rhythm and effort.    Auscultation of lungs: Auscultation of the lungs revealed no wheezing, no rales or crackles were heard bilaterally. no rhonchi. no friction rub. No diminished breath sounds. no bronchial breath sounds.   Cardiovascular   Auscultation of heart: Abnormal.  The heart rate was normal. The rhythm was regular, occasional skipped beat is noted. Heart sounds: normal S1 and normal S2. A grade 2/6 systolic murmur was heard at the LUSB. A grade 1/6 systolic murmur was heard at the RLSB. Unchanged. No carotid bruits are noted.   Trace edema of the right ankle. Bilateral calves symmetrical in size. No elicited tenderness with palpation. " Varicosities are noted (L>R)   Lymphatic   Palpation of lymph nodes in neck: No cervical lymphadenopathy.   Neurologic   Cranial nerves: Nerves 2-12 were intact, no focal neuro defects.   Psychiatric   Orientation: Oriented to person, place, and time.    Mood and affect: Normal. stable on daily bupropion dose  MSK  Bilateral calves are soft to palpation without elicited tenderness or noticeable circumferential asymmetry    Skin  No skin lesions visible on exposed skin.     Assessment & Plan  Routine general medical examination at [facility] care facility  Medicare wellness exam completed, DEXA scan ordered  Orders:    1 Year Follow Up In Primary Care - Wellness Exam; Future    CBC and Auto Differential; Future    Comprehensive Metabolic Panel; Future    Benign essential hypertension  Continue amlodipine for now, BP is 150/80, patient cannot take ACE inhibitor, trial of low dose Jardiance, patient is not able to take an ACE inhibitor or ARB for CKD risk reduction  Orders:    empagliflozin (Jardiance) 10 mg tablet; Take 1 tablet (10 mg) by mouth once daily.    Referral to Clinical Pharmacy; Future    CBC and Auto Differential; Future    Comprehensive Metabolic Panel; Future    Hyperlipidemia, unspecified hyperlipidemia type  LDL is controlled   Orders:    CBC and Auto Differential; Future    Comprehensive Metabolic Panel; Future    Lipid Panel; Future    Type 2 diabetes mellitus without complication, without long-term current use of insulin  Continue metformin, add low dose Jardiance, will refer to clinical pharmacist  Orders:    empagliflozin (Jardiance) 10 mg tablet; Take 1 tablet (10 mg) by mouth once daily.    Referral to Clinical Pharmacy; Future    Albumin-Creatinine Ratio, Urine Random; Future    CBC and Auto Differential; Future    Comprehensive Metabolic Panel; Future    Hemoglobin A1C; Future    Acquired hypothyroidism  No med changes, continue to monitor  Orders:    CBC and Auto Differential; Future     Comprehensive Metabolic Panel; Future    TSH with reflex to Free T4 if abnormal; Future    Vitamin B12 deficiency  Continue to monitor  Orders:    CBC and Auto Differential; Future    Comprehensive Metabolic Panel; Future    Vitamin B12; Future    Vitamin D deficiency  Continue to monitor  Orders:    CBC and Auto Differential; Future    Comprehensive Metabolic Panel; Future    Vitamin D 25-Hydroxy,Total (for eval of Vitamin D levels); Future    XR DEXA bone density; Future    Malignant neoplasm of ascending colon (Multi)  Follow up colonoscopy next year  Orders:    CBC and Auto Differential; Future    Comprehensive Metabolic Panel; Future    Diabetic polyneuropathy associated with type 2 diabetes mellitus    Orders:    CBC and Auto Differential; Future    Comprehensive Metabolic Panel; Future    Type 2 diabetes mellitus with diabetic polyneuropathy, without long-term current use of insulin    Orders:    empagliflozin (Jardiance) 10 mg tablet; Take 1 tablet (10 mg) by mouth once daily.    CBC and Auto Differential; Future    Comprehensive Metabolic Panel; Future    Hemoglobin A1C; Future    Type 2 diabetes mellitus with carpal tunnel syndrome (Multi)    Orders:    CBC and Auto Differential; Future    Comprehensive Metabolic Panel; Future    Type 2 diabetes mellitus with obesity (Multi)  Trial of low dose Jardiance  Orders:    empagliflozin (Jardiance) 10 mg tablet; Take 1 tablet (10 mg) by mouth once daily.    Referral to Clinical Pharmacy; Future    CBC and Auto Differential; Future    Comprehensive Metabolic Panel; Future    Osteopenia of multiple sites    Orders:    XR DEXA bone density; Future    Postmenopausal bone loss    Orders:    XR DEXA bone density; Future     Medicare wellness exam completed  DEXA scan ordered, follow up in 6 months, patient will have dermatology evaluate the red lesion on the right forearm

## 2025-06-09 NOTE — ASSESSMENT & PLAN NOTE
Follow up colonoscopy next year  Orders:    CBC and Auto Differential; Future    Comprehensive Metabolic Panel; Future

## 2025-06-09 NOTE — ASSESSMENT & PLAN NOTE
Continue to monitor  Orders:    CBC and Auto Differential; Future    Comprehensive Metabolic Panel; Future    Vitamin D 25-Hydroxy,Total (for eval of Vitamin D levels); Future    XR DEXA bone density; Future

## 2025-06-09 NOTE — ASSESSMENT & PLAN NOTE
LDL is controlled   Orders:    CBC and Auto Differential; Future    Comprehensive Metabolic Panel; Future    Lipid Panel; Future

## 2025-06-09 NOTE — ASSESSMENT & PLAN NOTE
Trial of low dose Jardiance  Orders:    empagliflozin (Jardiance) 10 mg tablet; Take 1 tablet (10 mg) by mouth once daily.    Referral to Clinical Pharmacy; Future    CBC and Auto Differential; Future    Comprehensive Metabolic Panel; Future

## 2025-06-09 NOTE — ASSESSMENT & PLAN NOTE
Medicare wellness exam completed, DEXA scan ordered  Orders:    1 Year Follow Up In Primary Care - Wellness Exam; Future    CBC and Auto Differential; Future    Comprehensive Metabolic Panel; Future

## 2025-06-09 NOTE — ASSESSMENT & PLAN NOTE
Continue metformin, add low dose Jardiance, will refer to clinical pharmacist  Orders:    empagliflozin (Jardiance) 10 mg tablet; Take 1 tablet (10 mg) by mouth once daily.    Referral to Clinical Pharmacy; Future    Albumin-Creatinine Ratio, Urine Random; Future    CBC and Auto Differential; Future    Comprehensive Metabolic Panel; Future    Hemoglobin A1C; Future

## 2025-06-10 ENCOUNTER — HOSPITAL ENCOUNTER (OUTPATIENT)
Dept: RADIOLOGY | Facility: CLINIC | Age: 80
Discharge: HOME | End: 2025-06-10
Payer: MEDICARE

## 2025-06-10 DIAGNOSIS — M85.89 OSTEOPENIA OF MULTIPLE SITES: ICD-10-CM

## 2025-06-10 DIAGNOSIS — E55.9 VITAMIN D DEFICIENCY: ICD-10-CM

## 2025-06-10 DIAGNOSIS — M81.0 POSTMENOPAUSAL BONE LOSS: ICD-10-CM

## 2025-06-10 PROCEDURE — 77080 DXA BONE DENSITY AXIAL: CPT

## 2025-06-10 PROCEDURE — 77080 DXA BONE DENSITY AXIAL: CPT | Performed by: RADIOLOGY

## 2025-06-14 DIAGNOSIS — E87.6 HYPOKALEMIA: ICD-10-CM

## 2025-06-14 DIAGNOSIS — J45.901 EXACERBATION OF ASTHMA, UNSPECIFIED ASTHMA SEVERITY, UNSPECIFIED WHETHER PERSISTENT (HHS-HCC): ICD-10-CM

## 2025-06-16 RX ORDER — MONTELUKAST SODIUM 10 MG/1
10 TABLET ORAL DAILY
Qty: 90 TABLET | Refills: 1 | Status: SHIPPED | OUTPATIENT
Start: 2025-06-16

## 2025-06-16 RX ORDER — POTASSIUM CHLORIDE 750 MG/1
10 TABLET, EXTENDED RELEASE ORAL DAILY
Qty: 90 TABLET | Refills: 1 | Status: SHIPPED | OUTPATIENT
Start: 2025-06-16

## 2025-06-20 DIAGNOSIS — E11.69 TYPE 2 DIABETES MELLITUS WITH OBESITY (MULTI): ICD-10-CM

## 2025-06-20 DIAGNOSIS — E66.9 TYPE 2 DIABETES MELLITUS WITH OBESITY (MULTI): ICD-10-CM

## 2025-06-23 RX ORDER — METFORMIN HYDROCHLORIDE 850 MG/1
850 TABLET ORAL
Qty: 180 TABLET | Refills: 1 | Status: SHIPPED | OUTPATIENT
Start: 2025-06-23

## 2025-07-08 ENCOUNTER — APPOINTMENT (OUTPATIENT)
Dept: PHARMACY | Facility: HOSPITAL | Age: 80
End: 2025-07-08
Payer: MEDICARE

## 2025-07-16 ENCOUNTER — APPOINTMENT (OUTPATIENT)
Dept: PHARMACY | Facility: HOSPITAL | Age: 80
End: 2025-07-16
Payer: MEDICARE

## 2025-07-21 NOTE — PROGRESS NOTES
Clinical Pharmacy Visit    Patient ID: Tonja Castillo is a 80 y.o. female who presents for Diabetes and Hypertension.  Referring Provider: Melo Benjamin, *   Last visit: 6/9/25  Next visit: 12/08/25    Patient was referred to clinical pharmacy for Jardiance cost assistance. Here for first follow up.    Subjective   DIABETES  Current Diabetes Pharmacotherapy:  Metformin 850 mg 1 tab twice daily with meals  Jardiance 10 mg once daily  Patient has not start the medication  Dr GAMBOA started for CKD risk reduction and BP lowering benefits - patient cannot tolerate ACE or ARBs    Past diabetic medications include:  N/A    Social History:  Current diet:   - lots of veggies and fruit  - think she needs more protein  - coffee   - lots of water   Current exercise:   - walk but has lots of foot issues    Eye exam for this year?: yes - October 2024  Foot exam for this year?: yes - every 9 - 10 weeks due to multiple foot issues    Current monitoring regimen:   Patient is using: glucometer    Testing frequency: every other day    SMBG readings: 110 - 120 (fasting)    Any episodes of hypoglycemia? No  Hypoglycemia awareness? Yes - knows the symptoms    Medication Concerns:  Affordability/Accessibility: Jardiance is $0 copay through insurance  Adherence/Organization: no issues with compliance  Adverse Effects: right colectomy - so does not have a normal bowel movement   - loose stool is normal for her    Secondary Prevention:  Statin? Yes  ACE-I/ARB? Cannot tolerate ACEi/ ARB  Aspirin? No    Pertinent co-morbidities:  Chronic Kidney Disease: No  Liver Disease: No  Heart Failure: No  Cardiovascular Disease: No  Previous stroke: No  Previous MI: No  Sleep Apnea: Yes    Pertinent PMH Review:  PMH of Pancreatitis: No  PMH of Retinopathy: No  PMH of Urinary Tract Infections: No  PMH of MTC: No  PMH of MEN2: No  UACR/EGFR in last year?: Yes  ALBUMIN/CREATININE RATIO, RANDOM URINE   Date Value Ref Range Status   06/05/2025 2 <30  mg/g creat Final     Comment:        The ADA defines abnormalities in albumin  excretion as follows:     Albuminuria Category        Result (mg/g creatinine)     Normal to Mildly increased   <30  Moderately increased            Severely increased           > OR = 300     The ADA recommends that at least two of three  specimens collected within a 3-6 month period be  abnormal before considering a patient to be  within a diagnostic category.       Albumin/Creatinine Ratio   Date Value Ref Range Status   11/25/2024   Final     Comment:     One or more analytes used in this calculation is outside of the analytical measurement range. Calculation cannot be performed.   05/24/2024   Final     Comment:     One or more analytes used in this calculation is outside of the analytical measurement range. Calculation cannot be performed.       Immunizations:  Influenza? Yes  COVID? Yes  Pneumonia? Yes  Shingles? Yes  Hepatitis B? No     HYPERTENSION ASSESSMENT  Medication Therapy  Current Medications:   Amlodipine 10 mg once daily    Previous Medications:   Lisinopril (swelling/angioedema)     Clarifications to above regimen: none   Adverse Effects: none     Home BP Monitoring  BP Cuff Type: Wrist monitor - also has arm monitor but preferred wrist  Cuff Validated? No     ASSESSMENT OF Blood Pressure  In Office Readings  BP Readings from Last 3 Encounters:   06/09/25 150/80   05/06/25 138/76   12/05/24 145/80     Has the patient experienced any low blood pressures since last contact? No  - denies symptoms of low blood pressure: lightheadedness, dizziness, falls, blurry vision, clammy/pale skin   Has the patient experienced any high blood pressures since last contact? No  - denies symptoms of high blood pressure: headache, chest pain, vision problems    Current home BP readings: Has not track readings   Previous home BP readings: n/a     Sodium Intake:  salt added to cooking    Risk Assessment  Major Risk Factors  Include:  Hypertension  Diabetes mellitus  Age > 55 (men) or > 65 (women)  The 10-year ASCVD risk score (Vitaly COX, et al., 2019) is: 54.6%    Values used to calculate the score:      Age: 80 years      Clincally relevant sex: Female      Is Non- : No      Diabetic: Yes      Tobacco smoker: No      Systolic Blood Pressure: 150 mmHg      Is BP treated: No      HDL Cholesterol: 80 mg/dL      Total Cholesterol: 166 mg/dL  Known target organ damage:  None    Secondary Prevention  On Statin?: Yes, atorvastatin 40 mg  Immunizations Needed: All up-to-date and documented  Tobacco Use: non-smoker    Allergies[1]  Current Outpatient Medications   Medication Instructions    acetaminophen (Tylenol) 500 mg tablet Every 6 hours PRN    albuterol 90 mcg/actuation inhaler Every 6 hours    amLODIPine (NORVASC) 10 mg, oral, Daily    atorvastatin (LIPITOR) 40 mg, oral, Nightly    azelastine (Astelin) 137 mcg (0.1 %) nasal spray 1 spray, Each Nostril, 2 times daily, Use in each nostril as directed    buPROPion XL (WELLBUTRIN XL) 300 mg, oral, Every morning, Do not crush, chew, or split.    cholecalciferol (Vitamin D-3) 50 mcg (2,000 unit) capsule Take by mouth.    cinnamon 2,000 mg, 2 times daily    cyanocobalamin (VITAMIN B-12) 100 mcg, 3 times weekly    empagliflozin (JARDIANCE) 10 mg, oral, Daily    ferrous sulfate 325 (65 Fe) MG tablet 1 tablet, Daily    fexofenadine (Allegra) 180 mg tablet 1 tablet, Daily    gabapentin (NEURONTIN) 300 mg, oral, Nightly    hydrocortisone 2.5 % cream Topical, 2 times daily PRN    Klor-Con M10 10 mEq ER tablet 10 mEq, oral, Daily    levothyroxine (SYNTHROID, LEVOXYL) 112 mcg, oral, Daily, Take on an empty stomach at the same time each day, either 30 to 60 minutes prior to breakfast    magnesium oxide (Mag-Ox) 400 mg (241.3 mg magnesium) tablet TAKE 1 TABLET BY MOUTH DAILY    metFORMIN (GLUCOPHAGE) 850 mg, oral, 2 times daily (morning and late afternoon)    montelukast  (SINGULAIR) 10 mg, oral, Daily    omeprazole (PriLOSEC) 20 mg DR capsule Daily RT    vit A,C and E-lutein-minerals (Ocuvite with Lutein) 300 mcg-200 mg-27 mg-2 mg tablet 1 tablet, Daily    Wixela Inhub 250-50 mcg/dose diskus inhaler 1 puff, inhalation, Daily, Rinse mouth with water after use to reduce aftertaste and incidence of candidiasis. Do not swallow.       Objective     There were no vitals taken for this visit.    Lab Review  Lab Results   Component Value Date    BILITOT 0.7 06/05/2025    CALCIUM 9.2 06/05/2025    CO2 25 06/05/2025     06/05/2025    CREATININE 0.96 06/05/2025    GLUCOSE 92 06/05/2025    ALKPHOS 70 06/05/2025    K 4.0 06/05/2025    PROT 6.8 06/05/2025     06/05/2025    AST 17 06/05/2025    ALT 11 06/05/2025    BUN 26 (H) 06/05/2025    ANIONGAP 10 06/05/2025    MG 1.2 (L) 06/05/2025    ALBUMIN 4.3 06/05/2025    GFRF 76 05/05/2023   GFR 60 6/5/25  Lab Results   Component Value Date    TRIG 94 06/05/2025    CHOL 166 06/05/2025    HDL 80 06/05/2025     Lab Results   Component Value Date    HGBA1C 5.8 (H) 06/05/2025    HGBA1C 5.6 11/25/2024    HGBA1C 5.8 (H) 05/24/2024     The 10-year ASCVD risk score (Vitaly COX, et al., 2019) is: 54.6%    Values used to calculate the score:      Age: 80 years      Clincally relevant sex: Female      Is Non- : No      Diabetic: Yes      Tobacco smoker: No      Systolic Blood Pressure: 150 mmHg      Is BP treated: No      HDL Cholesterol: 80 mg/dL      Total Cholesterol: 166 mg/dL      Assessment/Plan   Problem List Items Addressed This Visit       Benign essential hypertension    Relevant Orders    Referral to Clinical Pharmacy    Type 2 diabetes mellitus with obesity (Multi) - Primary    Relevant Orders    Referral to Clinical Pharmacy     Notes/ Plans:    Diabetes  Patient's diabetes is controlled with A1c of 5.8% (Goal < 7%)    Patient's BG are controlled with fasting BG reading 110 - 120. She is doing well on Metformin  alone. Dr GAMBOA started patient on Jardiance for renal protection and its BP lowering benefits. Patient is to start Jardiance tmrw. Discussed if patient tolerate Jardiance well, we could potentially stop metformin use.    HTN  Patient's HTN is uncontrolled (Goal <130/80)    Patient cannot tolerate ACE or ARB. Currently taking amlodipine 10 mg without any issues. Patient has a wrist and arm BP monitor at home but she is not tracking her BP. Advised patient to start tracking BP at least 3x a week to provide readings for next clinical pharmacy visit to see if adjustment is needed.    Patient prefer her wrist monitor over arm monitor, wished to have it validate in office. Will schedule an inperson visit with Rhianna for this.     Pharmacotherapy  START Jardiance 10 mg once daily  CONTINUE   Metformin 850 mg 1 tab twice daily with meals  Amlodipine 10 mg once daily  Education provided:   Counseled patient on Jardiance MOA, expectations, duration of therapy, contraindications, administration, and monitoring parameters  Monitor your blood pressure: You should check your blood pressure regularly. Notify our clinic if your blood pressure readings are consistently higher than recommended.  Checking blood pressure at home:  Don't eat, smoke, or drink anything 30 mins before taking your blood pressure (especially coffee)  Empty your bladder before your reading  Sit in a comfortable chair for at least 5 mins before your reading  Put both feet flat on the ground and keep your legs uncrossed  Rest your arm with the cuff on a table at chest/heart height  Do not talk while your blood pressure is being measured   Check midday after taking blood pressure medications in the morning  If you work during the day or your morning is stressful (such as rushing out the door), check blood pressure in the evening   Answered all patient questions and concerns  Future considerations: Increase Jardiance to 25 mg daily/ consider add on  hydrochlorothiazide for more BP controlled  Follow up with clinical pharmacist: 8/29 @ 4 with Rhianna braun inperson appt for both DM & HTN    Thank you,  Alanis Goode, [facility]  Clinical Pharmacy Specialist  P: [phone]   Email: [email or web address]      Verbal consent to manage patient's drug therapy was obtained from the patient. They were informed they may decline to participate or withdraw from participation in pharmacy services at any time.          [1]   Allergies  Allergen Reactions    Bacitracin Other     swollen red eyes    Lisinopril Swelling and Angioedema    Codeine Other

## 2025-07-22 ENCOUNTER — TELEMEDICINE (OUTPATIENT)
Dept: PHARMACY | Facility: HOSPITAL | Age: 80
End: 2025-07-22
Payer: MEDICARE

## 2025-07-22 DIAGNOSIS — E11.69 TYPE 2 DIABETES MELLITUS WITH OBESITY (MULTI): Primary | ICD-10-CM

## 2025-07-22 DIAGNOSIS — E66.9 TYPE 2 DIABETES MELLITUS WITH OBESITY (MULTI): Primary | ICD-10-CM

## 2025-07-22 DIAGNOSIS — I10 BENIGN ESSENTIAL HYPERTENSION: ICD-10-CM

## 2025-07-23 DIAGNOSIS — E78.5 HYPERLIPIDEMIA, UNSPECIFIED HYPERLIPIDEMIA TYPE: ICD-10-CM

## 2025-07-23 DIAGNOSIS — M70.61 TROCHANTERIC BURSITIS OF RIGHT HIP: ICD-10-CM

## 2025-07-23 RX ORDER — ATORVASTATIN CALCIUM 40 MG/1
40 TABLET, FILM COATED ORAL NIGHTLY
Qty: 90 TABLET | Refills: 1 | Status: SHIPPED | OUTPATIENT
Start: 2025-07-23

## 2025-07-23 RX ORDER — GABAPENTIN 300 MG/1
300 CAPSULE ORAL NIGHTLY
Qty: 90 CAPSULE | Refills: 1 | Status: SHIPPED | OUTPATIENT
Start: 2025-07-23

## 2025-08-11 DIAGNOSIS — E03.9 ACQUIRED HYPOTHYROIDISM: ICD-10-CM

## 2025-08-11 RX ORDER — LEVOTHYROXINE SODIUM 112 UG/1
TABLET ORAL
Qty: 90 TABLET | Refills: 1 | Status: SHIPPED | OUTPATIENT
Start: 2025-08-11

## 2025-08-12 DIAGNOSIS — I10 BENIGN ESSENTIAL HYPERTENSION: ICD-10-CM

## 2025-08-12 RX ORDER — AMLODIPINE BESYLATE 10 MG/1
10 TABLET ORAL DAILY
Qty: 90 TABLET | Refills: 3 | Status: SHIPPED | OUTPATIENT
Start: 2025-08-12

## 2025-08-14 ENCOUNTER — TELEPHONE (OUTPATIENT)
Dept: PRIMARY CARE | Facility: CLINIC | Age: 80
End: 2025-08-14
Payer: MEDICARE

## 2025-08-15 ENCOUNTER — APPOINTMENT (OUTPATIENT)
Dept: PRIMARY CARE | Facility: CLINIC | Age: 80
End: 2025-08-15
Payer: MEDICARE

## 2025-08-22 ENCOUNTER — APPOINTMENT (OUTPATIENT)
Dept: PRIMARY CARE | Facility: CLINIC | Age: 80
End: 2025-08-22
Payer: MEDICARE

## 2025-08-26 ENCOUNTER — HOSPITAL ENCOUNTER (OUTPATIENT)
Dept: CARDIOLOGY | Facility: HOSPITAL | Age: 80
Discharge: HOME | End: 2025-08-26
Payer: MEDICARE

## 2025-08-26 DIAGNOSIS — R01.1 SYSTOLIC EJECTION MURMUR: ICD-10-CM

## 2025-08-26 DIAGNOSIS — I10 BENIGN ESSENTIAL HYPERTENSION: ICD-10-CM

## 2025-08-26 PROCEDURE — 93306 TTE W/DOPPLER COMPLETE: CPT | Performed by: INTERNAL MEDICINE

## 2025-08-26 PROCEDURE — 93306 TTE W/DOPPLER COMPLETE: CPT

## 2025-08-27 LAB
AORTIC VALVE MEAN GRADIENT: 5 MMHG
AORTIC VALVE PEAK VELOCITY: 1.56 M/S
AV PEAK GRADIENT: 10 MMHG
EJECTION FRACTION APICAL 4 CHAMBER: 62.4
EJECTION FRACTION: 58 %
LEFT ATRIUM VOLUME AREA LENGTH INDEX BSA: 30.4 ML/M2
LEFT VENTRICLE INTERNAL DIMENSION DIASTOLE: 4.06 CM (ref 3.5–6)
LEFT VENTRICULAR OUTFLOW TRACT DIAMETER: 1.8 CM
LV EJECTION FRACTION BIPLANE: 70 %
MITRAL VALVE E/A RATIO: 0.86
MITRAL VALVE E/E' RATIO: 9.65
RIGHT VENTRICLE FREE WALL PEAK S': 9.73 CM/S
TRICUSPID ANNULAR PLANE SYSTOLIC EXCURSION: 1.4 CM

## 2025-08-29 ENCOUNTER — APPOINTMENT (OUTPATIENT)
Dept: PRIMARY CARE | Facility: CLINIC | Age: 80
End: 2025-08-29
Payer: MEDICARE

## 2025-08-29 VITALS — DIASTOLIC BLOOD PRESSURE: 77 MMHG | SYSTOLIC BLOOD PRESSURE: 139 MMHG

## 2025-08-29 DIAGNOSIS — I10 BENIGN ESSENTIAL HYPERTENSION: Primary | ICD-10-CM

## 2025-08-29 RX ORDER — AMLODIPINE BESYLATE 5 MG/1
5 TABLET ORAL DAILY
Qty: 30 TABLET | Refills: 3 | Status: SHIPPED | OUTPATIENT
Start: 2025-08-29 | End: 2025-12-27

## 2025-09-02 ENCOUNTER — OFFICE VISIT (OUTPATIENT)
Dept: CARDIOLOGY | Facility: HOSPITAL | Age: 80
End: 2025-09-02
Payer: MEDICARE

## 2025-09-02 VITALS
BODY MASS INDEX: 27.85 KG/M2 | WEIGHT: 157.2 LBS | DIASTOLIC BLOOD PRESSURE: 62 MMHG | SYSTOLIC BLOOD PRESSURE: 126 MMHG | HEART RATE: 73 BPM | HEIGHT: 63 IN

## 2025-09-02 DIAGNOSIS — R94.39 ABNORMAL STRESS TEST: ICD-10-CM

## 2025-09-02 DIAGNOSIS — I10 BENIGN ESSENTIAL HYPERTENSION: ICD-10-CM

## 2025-09-02 DIAGNOSIS — E78.5 HYPERLIPIDEMIA, UNSPECIFIED HYPERLIPIDEMIA TYPE: ICD-10-CM

## 2025-09-02 DIAGNOSIS — R01.1 SYSTOLIC EJECTION MURMUR: ICD-10-CM

## 2025-09-02 PROCEDURE — 3078F DIAST BP <80 MM HG: CPT | Performed by: INTERNAL MEDICINE

## 2025-09-02 PROCEDURE — 1159F MED LIST DOCD IN RCRD: CPT | Performed by: INTERNAL MEDICINE

## 2025-09-02 PROCEDURE — 3074F SYST BP LT 130 MM HG: CPT | Performed by: INTERNAL MEDICINE

## 2025-09-02 PROCEDURE — 99213 OFFICE O/P EST LOW 20 MIN: CPT | Performed by: INTERNAL MEDICINE

## 2025-09-02 PROCEDURE — 93005 ELECTROCARDIOGRAM TRACING: CPT | Performed by: INTERNAL MEDICINE

## 2025-09-02 PROCEDURE — 1160F RVW MEDS BY RX/DR IN RCRD: CPT | Performed by: INTERNAL MEDICINE

## 2025-09-02 PROCEDURE — 1036F TOBACCO NON-USER: CPT | Performed by: INTERNAL MEDICINE

## 2025-09-16 ENCOUNTER — APPOINTMENT (OUTPATIENT)
Dept: PRIMARY CARE | Facility: CLINIC | Age: 80
End: 2025-09-16
Payer: MEDICARE

## 2025-12-08 ENCOUNTER — APPOINTMENT (OUTPATIENT)
Dept: PRIMARY CARE | Facility: CLINIC | Age: 80
End: 2025-12-08
Payer: MEDICARE

## 2025-12-11 ENCOUNTER — APPOINTMENT (OUTPATIENT)
Dept: PRIMARY CARE | Facility: CLINIC | Age: 80
End: 2025-12-11
Payer: MEDICARE

## 2026-06-09 ENCOUNTER — APPOINTMENT (OUTPATIENT)
Dept: PRIMARY CARE | Facility: CLINIC | Age: 81
End: 2026-06-09
Payer: MEDICARE

## (undated) DEVICE — SUTURE, ETHIBOND, 0, 18 IN, CR/MO-7, GREEN

## (undated) DEVICE — SOLUTION, IRRIGATION, SODIUM CHLORIDE 0.9%, 1000 ML, POUR BOTTLE

## (undated) DEVICE — SUTURE, VICRYL, 2-0, 27 IN, CT-1, VIOLET

## (undated) DEVICE — PREP TRAY, SKIN, DRY, W/GLOVES

## (undated) DEVICE — TOWEL PACK, STERILE, 4/PACK, BLUE

## (undated) DEVICE — SUTURE, SILK, 1, 30 IN, LABYRINTH, BLACK

## (undated) DEVICE — GLOVE, SURGICAL, PROTEXIS PI BLUE W/NEUTHERA, 7.5, PF, LF

## (undated) DEVICE — Device

## (undated) DEVICE — SUTURE, VICRYL, 4-0, 18 IN, UNDYED BR PS-2

## (undated) DEVICE — BINDER, ABDOMINAL, 3 PANEL, 9 X 30-45 IN, SM/MED

## (undated) DEVICE — DRESSING, GAUZE, SPONGE, 12 PLY, 4 X 4 IN, PLASTIC POUCH, STRL 10PK

## (undated) DEVICE — SUTURE, PROLENE, 1 X 60IN TP-1, BLUE

## (undated) DEVICE — DRAPE, SHEET, ENDOSCOPY, GENERAL, FENESTRATED, ARMBOARD COVER, 98 X 123.5 IN, DISPOSABLE, LF, STERILE

## (undated) DEVICE — SUTURE, PROLENE, 0, 30 IN, CT1, BLUE

## (undated) DEVICE — BINDER, ABDOMINAL, 3 PANEL, 9 X 46-62 IN, MED/LG

## (undated) DEVICE — COVER HANDLE LIGHT, STERIS, BLUE, STERILE

## (undated) DEVICE — SUTURE, PDSII, 1, TP-1, VIL, MONO, 48LP

## (undated) DEVICE — STAPLER, SKIN, MULTIFIRE, PREMIUM, WIDE, 35 W

## (undated) DEVICE — SPONGE, DISSECTING, PEANUT, PEEL, STERILE 5